# Patient Record
Sex: FEMALE | Race: WHITE
[De-identification: names, ages, dates, MRNs, and addresses within clinical notes are randomized per-mention and may not be internally consistent; named-entity substitution may affect disease eponyms.]

---

## 2018-03-13 ENCOUNTER — TELEPHONE (OUTPATIENT)
Dept: CASE MANAGEMENT | Age: 63
End: 2018-03-13

## 2018-03-13 NOTE — TELEPHONE ENCOUNTER
No call was made, encounter was opened for documentation in the lung nodule navigator flow sheet. Patient has suggested follow up per [place] for management of incidental pulmonary nodules. Patients with nodules measuring under 0.8cm are not automatically enrolled into incidental pulmonary nodule program.   Notification of nodules letter faxed to Danielle Kc DO 3/13/2018 10:44 AM along with instructions on how to enroll this patient into our incidental pulmonary nodule program if desired. Receipt verified. Letter mailed to patients home stating she should contact her primary care physician to discuss any follow up recommendations.    Christelle Hale, Lung Nodule Navigator

## 2018-03-20 ENCOUNTER — OFFICE VISIT (OUTPATIENT)
Dept: CARDIOLOGY CLINIC | Age: 63
End: 2018-03-20
Payer: COMMERCIAL

## 2018-03-20 VITALS
HEIGHT: 65 IN | RESPIRATION RATE: 20 BRPM | BODY MASS INDEX: 44.98 KG/M2 | DIASTOLIC BLOOD PRESSURE: 76 MMHG | HEART RATE: 86 BPM | SYSTOLIC BLOOD PRESSURE: 110 MMHG | WEIGHT: 270 LBS

## 2018-03-20 DIAGNOSIS — R06.09 DOE (DYSPNEA ON EXERTION): ICD-10-CM

## 2018-03-20 DIAGNOSIS — I50.20 SYSTOLIC CONGESTIVE HEART FAILURE, UNSPECIFIED CONGESTIVE HEART FAILURE CHRONICITY: Primary | ICD-10-CM

## 2018-03-20 PROCEDURE — 1036F TOBACCO NON-USER: CPT | Performed by: INTERNAL MEDICINE

## 2018-03-20 PROCEDURE — G8417 CALC BMI ABV UP PARAM F/U: HCPCS | Performed by: INTERNAL MEDICINE

## 2018-03-20 PROCEDURE — 99214 OFFICE O/P EST MOD 30 MIN: CPT | Performed by: INTERNAL MEDICINE

## 2018-03-20 PROCEDURE — 3014F SCREEN MAMMO DOC REV: CPT | Performed by: INTERNAL MEDICINE

## 2018-03-20 PROCEDURE — 3017F COLORECTAL CA SCREEN DOC REV: CPT | Performed by: INTERNAL MEDICINE

## 2018-03-20 PROCEDURE — G8428 CUR MEDS NOT DOCUMENT: HCPCS | Performed by: INTERNAL MEDICINE

## 2018-03-20 PROCEDURE — G8484 FLU IMMUNIZE NO ADMIN: HCPCS | Performed by: INTERNAL MEDICINE

## 2018-03-20 PROCEDURE — 93000 ELECTROCARDIOGRAM COMPLETE: CPT | Performed by: INTERNAL MEDICINE

## 2018-03-20 NOTE — PROGRESS NOTES
affect. Behavior is normal.     EKG:  normal sinus rhythm, nonspecific ST and T waves changes. ASSESSMENT AND PLAN:  Patient Active Problem List   Diagnosis    Valvular disease    Pulmonary hypertension    CHF (congestive heart failure) ([address]    Atrial fibrillation or flutter     1. Hx of MVR/OLIVER/CHF:    S/P MV replacement with # [address]. Cardiac cath in 2008: normal coronaries     BOOKER to assess. 2. Paroxysmal Afib/Aflutter: In sinus. S/P multiple ablations last was in 4/10 with Dr. Richard Wesley in [facility]  Seen locally by Dr. Juma Small. Perlita Hayes D.O.   Cardiologist  Cardiology, [facility]

## 2018-03-22 RX ORDER — ALBUTEROL SULFATE 90 UG/1
2 AEROSOL, METERED RESPIRATORY (INHALATION) EVERY 4 HOURS PRN
COMMUNITY

## 2018-03-22 RX ORDER — SERTRALINE HYDROCHLORIDE 100 MG/1
200 TABLET, FILM COATED ORAL DAILY
COMMUNITY

## 2018-03-22 RX ORDER — FUROSEMIDE 20 MG/1
20 TABLET ORAL 2 TIMES DAILY
COMMUNITY
End: 2020-08-27 | Stop reason: SDUPTHER

## 2018-03-28 ENCOUNTER — ANESTHESIA (OUTPATIENT)
Dept: NON INVASIVE DIAGNOSTICS | Age: 63
End: 2018-03-28

## 2018-03-28 ENCOUNTER — HOSPITAL ENCOUNTER (OUTPATIENT)
Dept: NON INVASIVE DIAGNOSTICS | Age: 63
Discharge: HOME | End: 2018-03-28
Payer: COMMERCIAL

## 2018-03-28 ENCOUNTER — ANESTHESIA EVENT (OUTPATIENT)
Dept: NON INVASIVE DIAGNOSTICS | Age: 63
End: 2018-03-28

## 2018-03-28 VITALS
BODY MASS INDEX: 42.59 KG/M2 | WEIGHT: 265 LBS | HEART RATE: 74 BPM | TEMPERATURE: 97.8 F | OXYGEN SATURATION: 98 % | HEIGHT: 66 IN | SYSTOLIC BLOOD PRESSURE: 113 MMHG | DIASTOLIC BLOOD PRESSURE: 57 MMHG | RESPIRATION RATE: 18 BRPM

## 2018-03-28 VITALS
SYSTOLIC BLOOD PRESSURE: 84 MMHG | RESPIRATION RATE: 22 BRPM | OXYGEN SATURATION: 96 % | DIASTOLIC BLOOD PRESSURE: 51 MMHG

## 2018-03-28 PROCEDURE — 2580000003 HC RX 258: Performed by: NURSE ANESTHETIST, CERTIFIED REGISTERED

## 2018-03-28 PROCEDURE — 3700000000 HC ANESTHESIA ATTENDED CARE

## 2018-03-28 PROCEDURE — 93325 DOPPLER ECHO COLOR FLOW MAPG: CPT

## 2018-03-28 PROCEDURE — 93320 DOPPLER ECHO COMPLETE: CPT

## 2018-03-28 PROCEDURE — 6360000002 HC RX W HCPCS: Performed by: NURSE ANESTHETIST, CERTIFIED REGISTERED

## 2018-03-28 PROCEDURE — 93312 ECHO TRANSESOPHAGEAL: CPT

## 2018-03-28 PROCEDURE — 3700000001 HC ADD 15 MINUTES (ANESTHESIA)

## 2018-03-28 PROCEDURE — 7100000001 HC PACU RECOVERY - ADDTL 15 MIN

## 2018-03-28 PROCEDURE — 7100000000 HC PACU RECOVERY - FIRST 15 MIN

## 2018-03-28 PROCEDURE — 6360000002 HC RX W HCPCS

## 2018-03-28 RX ORDER — SODIUM CHLORIDE 9 MG/ML
INJECTION, SOLUTION INTRAVENOUS CONTINUOUS PRN
Status: DISCONTINUED | OUTPATIENT
Start: 2018-03-28 | End: 2018-03-28 | Stop reason: SDUPTHER

## 2018-03-28 RX ORDER — PROPOFOL 10 MG/ML
INJECTION, EMULSION INTRAVENOUS PRN
Status: DISCONTINUED | OUTPATIENT
Start: 2018-03-28 | End: 2018-03-28 | Stop reason: SDUPTHER

## 2018-03-28 RX ADMIN — PROPOFOL 150 MG: 10 INJECTION, EMULSION INTRAVENOUS at 14:10

## 2018-03-28 RX ADMIN — SODIUM CHLORIDE: 9 INJECTION, SOLUTION INTRAVENOUS at 14:05

## 2018-03-28 RX ADMIN — PROPOFOL 75 MG: 10 INJECTION, EMULSION INTRAVENOUS at 14:15

## 2018-03-28 ASSESSMENT — PAIN DESCRIPTION - PROGRESSION
CLINICAL_PROGRESSION: NOT CHANGED

## 2018-03-28 ASSESSMENT — PAIN DESCRIPTION - PAIN TYPE
TYPE: ACUTE PAIN

## 2018-03-28 ASSESSMENT — PAIN DESCRIPTION - ORIENTATION
ORIENTATION: INNER

## 2018-03-28 ASSESSMENT — PAIN - FUNCTIONAL ASSESSMENT: PAIN_FUNCTIONAL_ASSESSMENT: 0-10

## 2018-03-28 ASSESSMENT — PAIN SCALES - GENERAL
PAINLEVEL_OUTOF10: 0
PAINLEVEL_OUTOF10: 0
PAINLEVEL_OUTOF10: 1
PAINLEVEL_OUTOF10: 1

## 2018-03-28 ASSESSMENT — PAIN DESCRIPTION - LOCATION
LOCATION: THROAT

## 2018-03-28 ASSESSMENT — PAIN DESCRIPTION - DESCRIPTORS: DESCRIPTORS: OTHER (COMMENT)

## 2018-03-28 ASSESSMENT — ENCOUNTER SYMPTOMS: SHORTNESS OF BREATH: 1

## 2018-03-28 NOTE — PROGRESS NOTES
Discharge instructions given to pt and her  at this time both verbalized understanding of instructions pamphlets given  [phone] pt discharged via wheelchair at this time

## 2018-03-28 NOTE — ANESTHESIA PRE PROCEDURE
2 puffs into the lungs every 6 hours as needed for Wheezing      sertraline (ZOLOFT) 100 MG tablet Take 200 mg by mouth daily Instructed to take am of procedure      furosemide (LASIX) 20 MG tablet Take 20 mg by mouth daily      nadolol (CORGARD) 40 MG tablet TAKE 1 TABLET DAILY 90 tablet 3    lisinopril (PRINIVIL;ZESTRIL) 10 MG tablet TAKE 1 TABLET DAILY 90 tablet 3    montelukast (SINGULAIR) 10 MG tablet Take 10 mg by mouth nightly   3    Budesonide-Formoterol Fumarate (SYMBICORT IN) Inhale 2 puffs into the lungs 2 times daily       KLOR-CON M20 20 MEQ tablet TAKE 1 TABLET DAILY 90 tablet 3    warfarin (COUMADIN) 6 MG tablet Take 6 mg by mouth daily. Taking 6 mg. X five days a week.  dofetilide (TIKOSYN) 500 MCG capsule Take 500 mcg by mouth 2 times daily.  Multiple Vitamin (MULTIVITAMIN PO) Take 1 tablet by mouth       OXYGEN Inhale into the lungs 3 l nasal cannula continous      Warfarin Sodium (COUMADIN PO) Take 8 mg by mouth. Taking 8 mg on Tues. And Thurs. No current facility-administered medications for this encounter.         Allergies:  No Known Allergies    Problem List:    Patient Active Problem List   Diagnosis Code    Valvular disease I38    Pulmonary hypertension I27.20    CHF (congestive heart failure) ([facility] I50.9    Atrial fibrillation or flutter [id number]       Past Medical History:        Diagnosis Date    [facility]rial fibrillation ([facility]     Atrial flutter ([facility]     CAD (coronary artery disease)     [facility]) [facility]     10/17/2011-echo revealed an estimated LVEF of 55-60%    COPD (chronic obstructive pulmonary disease) ([facility]     OLIVER (dyspnea on exertion)     Hypertension     Palpitations     Pulmonary hypertension     SOB (shortness of breath)     Valvular disease        Past Surgical History:        Procedure Laterality Date    ATRIAL ABLATION SURGERY      CARDIOVERSION      CARDIOVERSION  12/30/2011    Dr. Betsey Manrique converted to NSR with 50

## 2018-05-02 ENCOUNTER — OFFICE VISIT (OUTPATIENT)
Dept: CARDIOLOGY CLINIC | Age: 63
End: 2018-05-02
Payer: COMMERCIAL

## 2018-05-02 VITALS
DIASTOLIC BLOOD PRESSURE: 78 MMHG | RESPIRATION RATE: 20 BRPM | HEIGHT: 65 IN | WEIGHT: 275 LBS | SYSTOLIC BLOOD PRESSURE: 114 MMHG | BODY MASS INDEX: 45.82 KG/M2 | HEART RATE: 89 BPM

## 2018-05-02 DIAGNOSIS — I38 VALVULAR DISEASE: Primary | ICD-10-CM

## 2018-05-02 PROCEDURE — G8417 CALC BMI ABV UP PARAM F/U: HCPCS | Performed by: INTERNAL MEDICINE

## 2018-05-02 PROCEDURE — G8428 CUR MEDS NOT DOCUMENT: HCPCS | Performed by: INTERNAL MEDICINE

## 2018-05-02 PROCEDURE — 99214 OFFICE O/P EST MOD 30 MIN: CPT | Performed by: INTERNAL MEDICINE

## 2018-05-02 PROCEDURE — 3017F COLORECTAL CA SCREEN DOC REV: CPT | Performed by: INTERNAL MEDICINE

## 2018-05-02 PROCEDURE — 1036F TOBACCO NON-USER: CPT | Performed by: INTERNAL MEDICINE

## 2018-05-02 PROCEDURE — 93000 ELECTROCARDIOGRAM COMPLETE: CPT | Performed by: INTERNAL MEDICINE

## 2018-06-12 RX ORDER — NADOLOL 40 MG/1
TABLET ORAL
Qty: 90 TABLET | Refills: 3 | Status: SHIPPED | OUTPATIENT
Start: 2018-06-12 | End: 2019-04-08 | Stop reason: SDUPTHER

## 2018-06-12 RX ORDER — LISINOPRIL 10 MG/1
TABLET ORAL
Qty: 90 TABLET | Refills: 3 | Status: SHIPPED | OUTPATIENT
Start: 2018-06-12 | End: 2019-06-25 | Stop reason: SDUPTHER

## 2018-11-28 ENCOUNTER — OFFICE VISIT (OUTPATIENT)
Dept: CARDIOLOGY CLINIC | Age: 63
End: 2018-11-28
Payer: COMMERCIAL

## 2018-11-28 VITALS
SYSTOLIC BLOOD PRESSURE: 102 MMHG | WEIGHT: 265 LBS | OXYGEN SATURATION: 97 % | BODY MASS INDEX: 44.15 KG/M2 | HEART RATE: 78 BPM | HEIGHT: 65 IN | RESPIRATION RATE: 24 BRPM | DIASTOLIC BLOOD PRESSURE: 64 MMHG

## 2018-11-28 DIAGNOSIS — I48.0 PAROXYSMAL ATRIAL FIBRILLATION (HCC): Primary | ICD-10-CM

## 2018-11-28 DIAGNOSIS — I27.20 PULMONARY HYPERTENSION (HCC): ICD-10-CM

## 2018-11-28 PROCEDURE — 99214 OFFICE O/P EST MOD 30 MIN: CPT | Performed by: INTERNAL MEDICINE

## 2018-11-28 PROCEDURE — G8417 CALC BMI ABV UP PARAM F/U: HCPCS | Performed by: INTERNAL MEDICINE

## 2018-11-28 PROCEDURE — 93000 ELECTROCARDIOGRAM COMPLETE: CPT | Performed by: INTERNAL MEDICINE

## 2018-11-28 PROCEDURE — G8427 DOCREV CUR MEDS BY ELIG CLIN: HCPCS | Performed by: INTERNAL MEDICINE

## 2018-11-28 PROCEDURE — 1036F TOBACCO NON-USER: CPT | Performed by: INTERNAL MEDICINE

## 2018-11-28 PROCEDURE — 3017F COLORECTAL CA SCREEN DOC REV: CPT | Performed by: INTERNAL MEDICINE

## 2018-11-28 PROCEDURE — G8484 FLU IMMUNIZE NO ADMIN: HCPCS | Performed by: INTERNAL MEDICINE

## 2019-04-08 RX ORDER — NADOLOL 40 MG/1
TABLET ORAL
Qty: 90 TABLET | Refills: 3 | Status: SHIPPED
Start: 2019-04-08 | End: 2020-04-22

## 2019-06-25 RX ORDER — LISINOPRIL 10 MG/1
10 TABLET ORAL DAILY
Qty: 30 TABLET | Refills: 0 | Status: SHIPPED | OUTPATIENT
Start: 2019-06-25 | End: 2019-07-18 | Stop reason: SDUPTHER

## 2019-06-25 RX ORDER — LISINOPRIL 10 MG/1
TABLET ORAL
Qty: 90 TABLET | Refills: 3 | Status: SHIPPED | OUTPATIENT
Start: 2019-06-25 | End: 2019-07-18 | Stop reason: SDUPTHER

## 2019-07-18 RX ORDER — LISINOPRIL 10 MG/1
10 TABLET ORAL DAILY
Qty: 90 TABLET | Refills: 3 | Status: SHIPPED
Start: 2019-07-18 | End: 2020-07-27 | Stop reason: SDUPTHER

## 2020-04-23 RX ORDER — NADOLOL 40 MG/1
TABLET ORAL
Qty: 90 TABLET | Refills: 0 | Status: SHIPPED
Start: 2020-04-23 | End: 2020-07-27 | Stop reason: SDUPTHER

## 2020-07-28 RX ORDER — LISINOPRIL 10 MG/1
10 TABLET ORAL DAILY
Qty: 30 TABLET | Refills: 0 | Status: SHIPPED
Start: 2020-07-28 | End: 2020-08-27 | Stop reason: SDUPTHER

## 2020-07-28 RX ORDER — NADOLOL 40 MG/1
TABLET ORAL
Qty: 30 TABLET | Refills: 0 | Status: SHIPPED
Start: 2020-07-28 | End: 2020-08-27 | Stop reason: SDUPTHER

## 2020-08-27 ENCOUNTER — OFFICE VISIT (OUTPATIENT)
Dept: CARDIOLOGY CLINIC | Age: 65
End: 2020-08-27
Payer: MEDICARE

## 2020-08-27 VITALS
DIASTOLIC BLOOD PRESSURE: 72 MMHG | SYSTOLIC BLOOD PRESSURE: 98 MMHG | HEART RATE: 72 BPM | BODY MASS INDEX: 42.15 KG/M2 | HEIGHT: 65 IN | WEIGHT: 253 LBS

## 2020-08-27 PROCEDURE — 3017F COLORECTAL CA SCREEN DOC REV: CPT | Performed by: INTERNAL MEDICINE

## 2020-08-27 PROCEDURE — 1090F PRES/ABSN URINE INCON ASSESS: CPT | Performed by: INTERNAL MEDICINE

## 2020-08-27 PROCEDURE — G8417 CALC BMI ABV UP PARAM F/U: HCPCS | Performed by: INTERNAL MEDICINE

## 2020-08-27 PROCEDURE — 1036F TOBACCO NON-USER: CPT | Performed by: INTERNAL MEDICINE

## 2020-08-27 PROCEDURE — 93000 ELECTROCARDIOGRAM COMPLETE: CPT | Performed by: INTERNAL MEDICINE

## 2020-08-27 PROCEDURE — 99214 OFFICE O/P EST MOD 30 MIN: CPT | Performed by: INTERNAL MEDICINE

## 2020-08-27 PROCEDURE — 4040F PNEUMOC VAC/ADMIN/RCVD: CPT | Performed by: INTERNAL MEDICINE

## 2020-08-27 PROCEDURE — G8427 DOCREV CUR MEDS BY ELIG CLIN: HCPCS | Performed by: INTERNAL MEDICINE

## 2020-08-27 PROCEDURE — 1123F ACP DISCUSS/DSCN MKR DOCD: CPT | Performed by: INTERNAL MEDICINE

## 2020-08-27 PROCEDURE — G8400 PT W/DXA NO RESULTS DOC: HCPCS | Performed by: INTERNAL MEDICINE

## 2020-08-27 RX ORDER — LISINOPRIL 10 MG/1
10 TABLET ORAL DAILY
Qty: 90 TABLET | Refills: 3 | Status: SHIPPED
Start: 2020-08-27 | End: 2021-01-13 | Stop reason: SDUPTHER

## 2020-08-27 RX ORDER — NADOLOL 40 MG/1
TABLET ORAL
Qty: 90 TABLET | Refills: 3 | Status: SHIPPED
Start: 2020-08-27 | End: 2021-01-13 | Stop reason: SDUPTHER

## 2020-08-27 RX ORDER — POTASSIUM CHLORIDE 20 MEQ/1
TABLET, EXTENDED RELEASE ORAL
Qty: 90 TABLET | Refills: 3 | Status: SHIPPED | OUTPATIENT
Start: 2020-08-27

## 2020-08-27 RX ORDER — FUROSEMIDE 20 MG/1
20 TABLET ORAL 2 TIMES DAILY
Qty: 180 TABLET | Refills: 3 | Status: SHIPPED | OUTPATIENT
Start: 2020-08-27

## 2020-08-27 RX ORDER — ASPIRIN 81 MG/1
81 TABLET ORAL DAILY
COMMUNITY

## 2020-11-19 ENCOUNTER — HOSPITAL ENCOUNTER (OUTPATIENT)
Dept: HOSPITAL 83 - LAB | Age: 65
Discharge: HOME | End: 2020-11-19
Attending: FAMILY MEDICINE
Payer: MEDICARE

## 2020-11-19 DIAGNOSIS — Z79.899: ICD-10-CM

## 2020-11-19 DIAGNOSIS — R53.83: ICD-10-CM

## 2020-11-19 DIAGNOSIS — E55.9: Primary | ICD-10-CM

## 2020-11-19 DIAGNOSIS — R79.89: ICD-10-CM

## 2020-11-19 LAB
25(OH)D3 SERPL-MCNC: 27 NG/ML (ref 30–100)
ABSOLUTE BASO #: 0 10*3/UL (ref 0–0.1)
ABSOLUTE EOS #: 0.1 10*3/UL (ref 0–0.4)
ABSOLUTE NEUT #: 4.9 10*3/UL (ref 2.3–7.9)
ALBUMIN SERPL-MCNC: 3.6 GM/DL (ref 3.1–4.5)
ALBUMIN: 3.6 GM/DL (ref 3.1–4.5)
ALP BLD-CCNC: 64 U/L (ref 45–117)
ALP SERPL-CCNC: 64 U/L (ref 45–117)
ALT SERPL W P-5'-P-CCNC: 24 U/L (ref 12–78)
ALT SERPL-CCNC: 24 U/L (ref 12–78)
AST SERPL-CCNC: 16 IU/L (ref 3–35)
AST SERPL-CCNC: 16 IU/L (ref 3–35)
BACTERIA #/AREA URNS HPF: (no result) /[HPF]
BACTERIA, URINE: ABNORMAL
BASOPHILS # BLD AUTO: 0 10*3/UL (ref 0–0.1)
BASOPHILS %: 0.6 % (ref 0–1)
BASOPHILS NFR BLD AUTO: 0.6 % (ref 0–1)
BILIRUB SERPL-MCNC: 0.4 MG/DL (ref 0.2–1)
BILIRUBIN: NEGATIVE
BLOOD: NEGATIVE
BUN BLDV-MCNC: 19 MG/DL (ref 7–24)
BUN SERPL-MCNC: 19 MG/DL (ref 7–24)
CALCIUM SERPL-MCNC: 9.1 MG/DL (ref 8.5–10.5)
CHLORIDE BLD-SCNC: 107 MMOL/L (ref 98–107)
CHLORIDE SERPL-SCNC: 107 MMOL/L (ref 98–107)
CHOLEST SERPL-MCNC: 199 MG/DL (ref ?–200)
CHOLESTEROL: 199 MG/DL
CK SERPL-CCNC: 38 U/L (ref 26–192)
CLARITY: CLEAR
CO2: 30 MMOL/L (ref 21–32)
COLOR: YELLOW
CPK: 38 U/L (ref 26–192)
CREAT SERPL-MCNC: 0.89 MG/DL (ref 0.55–1.02)
CREAT SERPL-MCNC: 0.89 MG/DL (ref 0.55–1.02)
EOSINOPHIL # BLD AUTO: 0.1 10*3/UL (ref 0–0.4)
EOSINOPHIL # BLD AUTO: 1.7 % (ref 1–4)
EOSINOPHILS %: 1.7 % (ref 1–4)
EPI CELLS #/AREA URNS HPF: (no result) /[HPF]
EPITHELIAL CELLS, UA: ABNORMAL
ERYTHROCYTE [DISTWIDTH] IN BLOOD BY AUTOMATED COUNT: 13.6 % (ref 0–14.5)
ESTIMATED AVERAGE GLUCOSE: 120
GFR AFRICAN AMERICAN: > 60 ML/MIN
GFR SERPL CREATININE-BSD FRML MDRD: >60 ML/MIN/
GGT SERPL-CCNC: 16 U/L (ref 5–55)
GGT: 16 U/L (ref 5–55)
GLUCOSE: 88 MG/DL (ref 65–99)
GLUCOSE: NEGATIVE
HBA1C MFR BLD: 5.8 % (ref 4.8–5.6)
HCT VFR BLD AUTO: 37.2 % (ref 37–47)
HCT VFR BLD CALC: 37.2 % (ref 37–47)
HDLC SERPL-MCNC: 67 MG/DL (ref 40–60)
HDLC SERPL-MCNC: 67 MG/DL (ref 40–60)
HEMOGLOBIN: 11.9 G/DL (ref 12–16)
IMMATURE GRANULOCYTES #: 0 10*3/UL (ref 0–0.1)
IMMATURE GRANULOCYTES: 0.3 % (ref 0–1)
IMMATURE RETIC FRACT: 8.9 % (ref 2.4–13.3)
IRON SATURATION: 26 %
IRON SERPL-MCNC: 82 UG/DL (ref 50–170)
IRON: 82 UG/DL (ref 50–170)
KETONES: NEGATIVE
LDL CHOLESTEROL: 114 MG/DL (ref 9–159)
LDLC SERPL DIRECT ASSAY-MCNC: 114 MG/DL (ref 9–159)
LEUKOCYTE ESTERASE, URINE: ABNORMAL
LYMPHOCYTE %: 13.6 % (ref 27–41)
LYMPHOCYTES # BLD AUTO: 0.9 10*3/UL (ref 1.3–4.4)
LYMPHOCYTES # BLD: 0.9 10*3/UL (ref 1.3–4.4)
LYMPHOCYTES NFR BLD AUTO: 13.6 % (ref 27–41)
MCH RBC QN AUTO: 31.6 PG (ref 27–31)
MCH RBC QN AUTO: 31.6 PG (ref 27–31)
MCHC RBC AUTO-ENTMCNC: 32 G/DL (ref 33–37)
MCHC RBC AUTO-ENTMCNC: 32 G/DL (ref 33–37)
MCV RBC AUTO: 98.9 FL (ref 81–99)
MCV RBC AUTO: 98.9 FL (ref 81–99)
MONOCYTES # BLD AUTO: 0.5 10*3/UL (ref 0.1–1)
MONOCYTES # BLD: 0.5 10*3/UL (ref 0.1–1)
MONOCYTES %: 8.4 % (ref 3–9)
MONOCYTES NFR BLD MANUAL: 8.4 % (ref 3–9)
NEUT #: 4.9 10*3/UL (ref 2.3–7.9)
NEUT %: 75.4 % (ref 47–73)
NEUTROPHILS %: 75.4 % (ref 47–73)
NITRITE, URINE: NEGATIVE
NRBC BLD QL AUTO: 0 % (ref 0–0)
NUCLEATED RED BLOOD CELLS: 0 % (ref 0–0)
PDW BLD-RTO: 13.6 % (ref 0–14.5)
PH UR STRIP: 5.5 [PH] (ref 4.5–8)
PH, URINE: 5.5 (ref 4.5–8)
PLATELET # BLD AUTO: 159 10*3/UL (ref 130–400)
PLATELET # BLD: 159 10*3/UL (ref 130–400)
PMV BLD AUTO: 11.4 FL (ref 9.6–12.3)
PMV BLD AUTO: 11.4 FL (ref 9.6–12.3)
POTASSIUM SERPL-SCNC: 4.3 MMOL/L (ref 3.5–5.1)
POTASSIUM SERPL-SCNC: 4.3 MMOL/L (ref 3.5–5.1)
PROT SERPL-MCNC: 7.1 GM/DL (ref 6.4–8.2)
PROTEIN UA: ABNORMAL
RBC # BLD AUTO: 3.76 10*6/UL (ref 4.1–5.1)
RBC # BLD: 3.76 10*6/UL (ref 4.1–5.1)
RBC #/AREA URNS HPF: (no result) RBC/HPF (ref 0–2)
RBC UA: ABNORMAL RBC/HPF (ref 0–2)
RET-HE: 34.3 PG (ref 32.1–37.9)
RETICS/RBC NFR AUTO: 1.39 % (ref 0.5–2.5)
RETICULOCYTE ABSOLUTE COUNT: 0.05 10*6/UL (ref 0.03–0.1)
RETICULOCYTE COUNT PCT: 1.39 % (ref 0.5–2.5)
SODIUM BLD-SCNC: 141 MMOL/L (ref 136–145)
SODIUM SERPL-SCNC: 141 MMOL/L (ref 136–145)
SP GR UR: 1.02 (ref 1–1.03)
SPECIFIC GRAVITY UA: 1.02 (ref 1–1.03)
T3 SERPL-MCNC: 34 % (ref 31–39)
T3 UPTAKE: 34 % (ref 31–39)
T4 SERPL-MCNC: 5.9 UG/DL (ref 4.8–13.9)
T4 TOTAL: 5.9 UG/DL (ref 4.8–13.9)
TIBC SERPL-MCNC: 314 UG/DL (ref 250–450)
TOTAL IRON BINDING CAPACITY: 314 UG/DL (ref 250–450)
TOTAL PROTEIN: 7.1 GM/DL (ref 6.4–8.2)
TRIGL SERPL-MCNC: 88 MG/DL
TRIGL SERPL-MCNC: 88 MG/DL (ref ?–150)
TSH SERPL DL<=0.005 MIU/L-ACNC: 1.32 UIU/ML (ref 0.36–4.75)
TSH SERPL DL<=0.05 MIU/L-ACNC: 1.32 UIU/ML (ref 0.36–4.75)
UNSATURATED IRON BINDING CAPACITY: 232 UG/DL (ref 110–365)
UROBILINOGEN UR STRIP-MCNC: 1 E.U./DL (ref 0–1)
UROBILINOGEN, URINE: 1 E.U./DL (ref 0–1)
VITAMIN B12: 460 PG/ML (ref 247–911)
VLDLC SERPL CALC-MCNC: 18 MG/DL (ref 6–40)
VLDLC SERPL CALC-MCNC: 18 MG/DL (ref 6–40)
WBC # BLD: 6.5 10*3/UL (ref 4.8–10.8)
WBC #/AREA URNS HPF: (no result) WBC/HPF (ref 0–5)
WBC NRBC COR # BLD AUTO: 6.5 10*3/UL (ref 4.8–10.8)
WBC URINE: ABNORMAL WBC/HPF (ref 0–5)

## 2020-11-21 LAB
FERRITIN SERPL-MCNC: 154.4 NG/ML (ref 10–291)
FERRITIN: 154.4 NG/ML (ref 10–291)
FOLIC ACID, SERUM: 15.97 NG/ML
VITAMIN B-12: 460 PG/ML (ref 247–911)
VITAMIN D 25-HYDROXY: 27 NG/ML (ref 30–100)

## 2021-01-13 RX ORDER — LISINOPRIL 10 MG/1
10 TABLET ORAL DAILY
Qty: 90 TABLET | Refills: 3 | Status: SHIPPED
Start: 2021-01-13 | End: 2021-10-25

## 2021-01-13 RX ORDER — NADOLOL 40 MG/1
TABLET ORAL
Qty: 90 TABLET | Refills: 3 | Status: SHIPPED
Start: 2021-01-13 | End: 2021-11-05

## 2021-06-17 ENCOUNTER — HOSPITAL ENCOUNTER (OUTPATIENT)
Dept: HOSPITAL 83 - LAB | Age: 66
Discharge: HOME | End: 2021-06-17
Attending: FAMILY MEDICINE
Payer: MEDICARE

## 2021-06-17 DIAGNOSIS — R06.02: ICD-10-CM

## 2021-06-17 DIAGNOSIS — E78.5: Primary | ICD-10-CM

## 2021-06-17 DIAGNOSIS — Z79.899: ICD-10-CM

## 2021-06-17 DIAGNOSIS — R53.83: ICD-10-CM

## 2021-06-17 DIAGNOSIS — E55.9: ICD-10-CM

## 2021-06-17 DIAGNOSIS — R79.89: ICD-10-CM

## 2021-06-17 LAB
25(OH)D3 SERPL-MCNC: 34.5 NG/ML (ref 30–100)
ALBUMIN SERPL-MCNC: 3.6 GM/DL (ref 3.1–4.5)
ALP SERPL-CCNC: 65 U/L (ref 45–117)
ALT SERPL W P-5'-P-CCNC: 26 U/L (ref 12–78)
AST SERPL-CCNC: 16 IU/L (ref 3–35)
BASOPHILS # BLD AUTO: 0.1 10*3/UL (ref 0–0.1)
BASOPHILS NFR BLD AUTO: 0.7 % (ref 0–1)
BUN SERPL-MCNC: 34 MG/DL (ref 7–24)
CHLORIDE SERPL-SCNC: 107 MMOL/L (ref 98–107)
CHOLEST SERPL-MCNC: 234 MG/DL (ref ?–200)
CREAT SERPL-MCNC: 1.24 MG/DL (ref 0.55–1.02)
EOSINOPHIL # BLD AUTO: 0.1 10*3/UL (ref 0–0.4)
EOSINOPHIL # BLD AUTO: 1.6 % (ref 1–4)
ERYTHROCYTE [DISTWIDTH] IN BLOOD BY AUTOMATED COUNT: 13.2 % (ref 0–14.5)
FERRITIN SERPL-MCNC: 134.4 NG/ML (ref 10–291)
GGT SERPL-CCNC: 23 U/L (ref 5–55)
HCT VFR BLD AUTO: 35.6 % (ref 37–47)
IRON SERPL-MCNC: 72 UG/DL (ref 50–170)
LDLC SERPL DIRECT ASSAY-MCNC: 147 MG/DL (ref 9–159)
LYMPHOCYTES # BLD AUTO: 1.1 10*3/UL (ref 1.3–4.4)
LYMPHOCYTES NFR BLD AUTO: 13.9 % (ref 27–41)
MCH RBC QN AUTO: 32 PG (ref 27–31)
MCHC RBC AUTO-ENTMCNC: 31.7 G/DL (ref 33–37)
MCV RBC AUTO: 100.8 FL (ref 81–99)
MONOCYTES # BLD AUTO: 0.7 10*3/UL (ref 0.1–1)
MONOCYTES NFR BLD MANUAL: 8.8 % (ref 3–9)
NEUT #: 5.7 10*3/UL (ref 2.3–7.9)
NEUT %: 74.7 % (ref 47–73)
NRBC BLD QL AUTO: 0 % (ref 0–0)
PLATELET # BLD AUTO: 180 10*3/UL (ref 130–400)
PMV BLD AUTO: 10.9 FL (ref 9.6–12.3)
POTASSIUM SERPL-SCNC: 4.9 MMOL/L (ref 3.5–5.1)
PROT SERPL-MCNC: 7.8 GM/DL (ref 6.4–8.2)
RBC # BLD AUTO: 3.53 10*6/UL (ref 4.1–5.1)
RETICS/RBC NFR AUTO: 1.76 % (ref 0.5–2.5)
SODIUM SERPL-SCNC: 141 MMOL/L (ref 136–145)
TIBC SERPL-MCNC: 335 UG/DL (ref 250–450)
TRIGL SERPL-MCNC: 82 MG/DL (ref ?–150)
TSH SERPL DL<=0.005 MIU/L-ACNC: 1.35 UIU/ML (ref 0.36–4.75)
VITAMIN B12: 816 PG/ML (ref 247–911)
WBC NRBC COR # BLD AUTO: 7.6 10*3/UL (ref 4.8–10.8)

## 2021-07-02 ENCOUNTER — HOSPITAL ENCOUNTER (OUTPATIENT)
Dept: HOSPITAL 83 - LAB | Age: 66
Discharge: HOME | End: 2021-07-02
Attending: FAMILY MEDICINE
Payer: MEDICARE

## 2021-07-02 DIAGNOSIS — E78.5: Primary | ICD-10-CM

## 2021-07-02 DIAGNOSIS — R79.89: ICD-10-CM

## 2021-07-02 DIAGNOSIS — R53.83: ICD-10-CM

## 2021-07-02 DIAGNOSIS — E55.9: ICD-10-CM

## 2021-07-02 DIAGNOSIS — R74.8: ICD-10-CM

## 2021-07-02 DIAGNOSIS — Z79.899: ICD-10-CM

## 2021-07-02 LAB
BACTERIA #/AREA URNS HPF: (no result) /[HPF]
EPI CELLS #/AREA URNS HPF: (no result) /[HPF]
KETONES UR QL STRIP: (no result)
LEUKOCYTE ESTERASE UR QL STRIP: (no result)
PH UR STRIP: 5.5 [PH] (ref 4.5–8)
SP GR UR: 1.02 (ref 1–1.03)
UROBILINOGEN UR STRIP-MCNC: 0.2 E.U./DL (ref 0–1)
WBC #/AREA URNS HPF: (no result) WBC/HPF (ref 0–5)

## 2021-08-12 ENCOUNTER — TELEPHONE (OUTPATIENT)
Dept: CARDIOLOGY CLINIC | Age: 66
End: 2021-08-12

## 2021-08-12 NOTE — TELEPHONE ENCOUNTER
Patient is due for her annual visit but she would like an echo prior to visit, last echo 2018, can we order?

## 2021-08-13 DIAGNOSIS — I34.0 MITRAL VALVE INSUFFICIENCY, UNSPECIFIED ETIOLOGY: Primary | ICD-10-CM

## 2021-09-03 ENCOUNTER — HOSPITAL ENCOUNTER (OUTPATIENT)
Dept: CARDIOLOGY | Age: 66
Discharge: HOME | End: 2021-09-03
Payer: MEDICARE

## 2021-09-03 DIAGNOSIS — I34.0 MITRAL VALVE INSUFFICIENCY, UNSPECIFIED ETIOLOGY: ICD-10-CM

## 2021-09-03 LAB
LV EF: 55 %
LVEF MODALITY: NORMAL

## 2021-09-03 PROCEDURE — 93306 TTE W/DOPPLER COMPLETE: CPT

## 2021-10-05 ENCOUNTER — OFFICE VISIT (OUTPATIENT)
Dept: CARDIOLOGY CLINIC | Age: 66
End: 2021-10-05
Payer: MEDICARE

## 2021-10-05 VITALS
RESPIRATION RATE: 20 BRPM | BODY MASS INDEX: 40.98 KG/M2 | SYSTOLIC BLOOD PRESSURE: 102 MMHG | WEIGHT: 246 LBS | DIASTOLIC BLOOD PRESSURE: 68 MMHG | HEIGHT: 65 IN | HEART RATE: 69 BPM

## 2021-10-05 DIAGNOSIS — I27.20 PULMONARY HYPERTENSION (HCC): Primary | ICD-10-CM

## 2021-10-05 PROCEDURE — 1090F PRES/ABSN URINE INCON ASSESS: CPT | Performed by: INTERNAL MEDICINE

## 2021-10-05 PROCEDURE — G8427 DOCREV CUR MEDS BY ELIG CLIN: HCPCS | Performed by: INTERNAL MEDICINE

## 2021-10-05 PROCEDURE — 93000 ELECTROCARDIOGRAM COMPLETE: CPT | Performed by: INTERNAL MEDICINE

## 2021-10-05 PROCEDURE — G8400 PT W/DXA NO RESULTS DOC: HCPCS | Performed by: INTERNAL MEDICINE

## 2021-10-05 PROCEDURE — G8417 CALC BMI ABV UP PARAM F/U: HCPCS | Performed by: INTERNAL MEDICINE

## 2021-10-05 PROCEDURE — 1036F TOBACCO NON-USER: CPT | Performed by: INTERNAL MEDICINE

## 2021-10-05 PROCEDURE — 3017F COLORECTAL CA SCREEN DOC REV: CPT | Performed by: INTERNAL MEDICINE

## 2021-10-05 PROCEDURE — G8484 FLU IMMUNIZE NO ADMIN: HCPCS | Performed by: INTERNAL MEDICINE

## 2021-10-05 PROCEDURE — 99214 OFFICE O/P EST MOD 30 MIN: CPT | Performed by: INTERNAL MEDICINE

## 2021-10-05 PROCEDURE — 4040F PNEUMOC VAC/ADMIN/RCVD: CPT | Performed by: INTERNAL MEDICINE

## 2021-10-05 PROCEDURE — 1123F ACP DISCUSS/DSCN MKR DOCD: CPT | Performed by: INTERNAL MEDICINE

## 2021-10-05 RX ORDER — LORAZEPAM 0.5 MG/1
TABLET ORAL
COMMUNITY
Start: 2021-09-23

## 2021-10-05 NOTE — PROGRESS NOTES
CHIEF COMPLAINT: MVR/Afib    HISTORY OF PRESENT ILLNESS: Patient is a 78 y.o. female seen at the request of Jhonny Armendariz DO. Patient with history of MVR with OLIVER and LE edema issues. No CP or angina.      Past Medical History:   Diagnosis Date    Atrial fibrillation (HCC)     Atrial flutter (HCC)     CAD (coronary artery disease)     CHF (congestive heart failure) ([facility]     10/17/2011-echo revealed an estimated LVEF of 55-60%    COPD (chronic obstructive pulmonary disease) (HCC)     OLIVER (dyspnea on exertion)     Hypertension     Palpitations     Pulmonary hypertension (HCC)     SOB (shortness of breath)     Valvular disease        Patient Active Problem List   Diagnosis    Valvular disease    Pulmonary hypertension ([facility]    [facility]congest[facility] failure) ([facility]    Atrial fibrillation or flutter       No Known Allergies    Current Outpatient Medications   Medication Sig Dispense Refill    LORazepam (ATIVAN) 0.5 MG tablet ONE TABLET EVERY 8 TO 12 HOURS WHEN NEEDED ATIVAN      lisinopril (PRINIVIL;ZESTRIL) 10 MG tablet Take 1 tablet by mouth daily 90 tablet 3    nadolol (CORGARD) 40 MG tablet Take 1tablet daily 90 tablet 3    aspirin 81 MG EC tablet Take 81 mg by mouth daily      furosemide (LASIX) 20 MG tablet Take 1 tablet by mouth 2 times daily 180 tablet 3    potassium chloride (KLOR-CON M20) 20 MEQ extended release tablet TAKE 1 TABLET DAILY 90 tablet 3    albuterol sulfate HFA (PROVENTIL HFA) 108 (90 Base) MCG/ACT inhaler Inhale 2 puffs into the lungs every 4 hours as needed for Wheezing       sertraline (ZOLOFT) 100 MG tablet Take 200 mg by mouth daily Instructed to take am of procedure      montelukast (SINGULAIR) 10 MG tablet Take 10 mg by mouth nightly   3    OXYGEN Inhale into the lungs 3 l nasal cannula continous      Budesonide-Formoterol Fumarate (SYMBICORT IN) Inhale 2 puffs into the lungs 2 times daily       warfarin (COUMADIN) 6 MG tablet Take 5.5 mg by mouth daily Taking 5.5 mg every other day, 6 mg alternate every other day      dofetilide (TIKOSYN) 500 MCG capsule Take 500 mcg by mouth 2 times daily.  Multiple Vitamin (MULTIVITAMIN PO) Take 1 tablet by mouth        No current facility-administered medications for this visit. Social History     Socioeconomic History    Marital status:      Spouse name: Not on file    Number of children: Not on file    Years of education: Not on file    Highest education level: Not on file   Occupational History    Not on file   Tobacco Use    Smoking status: Former Smoker     Packs/day: 2.00     Years: 10.00     Pack years: 20.00     Types: Cigarettes     Quit date: 1988     Years since quittin.7    Smokeless tobacco: Never Used   Vaping Use    Vaping Use: Never used   Substance and Sexual Activity    Alcohol use: No    Drug use: No    Sexual activity: Not on file   Other Topics Concern    Not on file   Social History Narrative    Not on file     Social Determinants of Health     Financial Resource Strain:     Difficulty of Paying Living Expenses:    Food Insecurity:     Worried About [address] in the Last Year:     [address] [employer] in the Last Year:    Transportation Needs:     Lack of Transportation (Medical):      Lack of Transportation (Non-Medical):    Physical Activity:     Days of Exercise per Week:     Minutes of Exercise per Session:    Stress:     Feeling of Stress :    Social Connections:     Frequency of Communication with Friends and Family:     Frequency of Social Gatherings with Friends and Family:     Attends [employer]ars[employer] Services:     Active Member of Clubs or Organizations:     Attends Club or Organization Meetings:     Marital Status:    Intimate Partner Violence:     Fear of Current or Ex-Partner:     Emotionally Abused:     Physically Abused:     Sexually Abused:        Family History   Problem Relation Age of Onset    Diabetes Mother      Review of Systems:  Heart: as above   Lungs: as above   Eyes: denies changes in vision or discharge. Ears: denies changes in hearing or pain. Nose: denies epistaxis or masses   Throat: denies sore throat or trouble swallowing. Neuro: denies numbness, tingling, tremors. Skin: denies rashes or itching. : denies hematuria, dysuria   GI: denies vomiting, diarrhea   Psych: denies mood changed, anxiety, depression. All other systems negative. Physical Exam   /68   Pulse 69   Resp 20   Ht 5' 5\" (1.651 m)   Wt 246 lb (111.6 kg)   BMI 40.94 kg/m²   Constitutional: Oriented to person, place, and time. Well-developed and well-nourished. No distress. Head: Normocephalic and atraumatic. Eyes: EOM are normal. Pupils are equal, round, and reactive to light. Neck: Normal range of motion. Neck supple. No hepatojugular reflux and no JVD present. Carotid bruit is not present. No tracheal deviation present. No thyromegaly present. Cardiovascular: Normal rate, regular rhythm, ESTER 2/6  Pulmonary/Chest: Effort normal and breath sounds normal. No respiratory distress. No wheezes. No rales. No tenderness. Abdominal: Soft. Bowel sounds are normal. No distension and no mass. No tenderness. No rebound and no guarding. Musculoskeletal: Normal range of motion. No edema and no tenderness. Lymphadenopathy:   No cervical adenopathy. No groin adenopathy. Neurological: Alert and oriented to person, place, and time. Skin: Skin is warm and dry. No rash noted. Not diaphoretic. No erythema. Psychiatric: Normal mood and affect. Behavior is normal.     EKG:  normal sinus rhythm, nonspecific ST and T waves changes. Echo Summary 3/28/18:   Overall ejection fraction is normal .   Normal right ventricle structure and function.   Normally functioning bileaflet mechanical valve in mitral position.   Mean transmitral gradient 5 mmHg.   Physiologic mitral regurgitation is present.     Echo Summary 9/3/2021:   Ejection fraction is visually estimated at 55%. No regional wall motion abnormalities seen. Normal right ventricle structure and function. Left atrial volume index of 30 ml per meters squared BSA. The aortic valve is trileaflet. Mild aortic stenosis is present. The aortic valve area is 1.7 cm2 with a maximum gradient of 22 mmHg and a mean gradient of 12 mmHg. No evidence of aortic valve regurgitation. Mechanical prosthetic valve in the mitral position. Mean transmitral gradient 11 mmHg. (prior mean was 7 mmHg)   Physiologic and/or trace mitral regurgitation is present. Moderate tricuspid regurgitation. Pulmonary hypertension is mild. RVSP is 44 mmHg. ASSESSMENT AND PLAN:  Patient Active Problem List   Diagnosis    Valvular disease    Pulmonary hypertension ([address]    CHF (congestive heart failure) ([address]    Atrial fibrillation or flutter     1. Hx of MVR/OLIVER/CHF:    S/P MV replacement with #[address]. Cardiac cath in 2008: normal coronaries     TTE echo 0/3/2021.     2. Paroxysmal Afib/Aflutter: In sinus. Rhythm controlled on tikosyn. On warfarin. S/P multiple ablations last was in 4/10 with Dr. Agnieszka Corcoran in [facility]    Seen locally by Dr. Pedro Coffman. 3. COPD: On oxygen. Anand Mireles D.O.   Cardiologist  Cardiology, [facility]

## 2021-10-20 ENCOUNTER — TELEPHONE (OUTPATIENT)
Dept: CARDIOLOGY CLINIC | Age: 66
End: 2021-10-20

## 2021-10-20 NOTE — TELEPHONE ENCOUNTER
Patient needs cardiac clearance for an EGD&Colonoscopy with , she would need instructions regarding warfarin and if she needs bridged, last OV 2weeks ago, please advise

## 2021-10-20 NOTE — TELEPHONE ENCOUNTER
Okay to proceed. Patient is to hold warfarin 5 days prior. She is to start subcutaneous lovenox (script sent) 3 days prior and take it twice a day. Hold am of procedure and restart lovenox and warfarin as soon as safe from procedure standpoint. Will need daily INR after resuming warfarin until we get her back to therapeutic. Alesha Fitzgerald D.O.   Cardiologist  Cardiology, [facility]

## 2021-10-25 RX ORDER — LISINOPRIL 10 MG/1
10 TABLET ORAL DAILY
Qty: 90 TABLET | Refills: 3 | Status: SHIPPED | OUTPATIENT
Start: 2021-10-25

## 2021-11-03 ENCOUNTER — HOSPITAL ENCOUNTER (OUTPATIENT)
Dept: HOSPITAL 83 - LAB | Age: 66
Discharge: HOME | End: 2021-11-03
Attending: INTERNAL MEDICINE
Payer: MEDICARE

## 2021-11-03 DIAGNOSIS — Z79.899: ICD-10-CM

## 2021-11-03 DIAGNOSIS — N18.31: Primary | ICD-10-CM

## 2021-11-03 LAB
ALBUMIN SERPL-MCNC: 3.4 GM/DL (ref 3.1–4.5)
BACTERIA #/AREA URNS HPF: (no result) /[HPF]
BASOPHILS # BLD AUTO: 0.1 10*3/UL (ref 0–0.1)
BASOPHILS NFR BLD AUTO: 0.7 % (ref 0–1)
BUN SERPL-MCNC: 28 MG/DL (ref 7–24)
CHLORIDE SERPL-SCNC: 106 MMOL/L (ref 98–107)
CREAT SERPL-MCNC: 1.19 MG/DL (ref 0.55–1.02)
CREAT UR-MCNC: 163 MG/DL
EOSINOPHIL # BLD AUTO: 0.2 10*3/UL (ref 0–0.4)
EOSINOPHIL # BLD AUTO: 2.4 % (ref 1–4)
ERYTHROCYTE [DISTWIDTH] IN BLOOD BY AUTOMATED COUNT: 13.2 % (ref 0–14.5)
HCT VFR BLD AUTO: 34.3 % (ref 37–47)
LEUKOCYTE ESTERASE UR QL STRIP: (no result)
LYMPHOCYTES # BLD AUTO: 1.1 10*3/UL (ref 1.3–4.4)
LYMPHOCYTES NFR BLD AUTO: 15.6 % (ref 27–41)
MCH RBC QN AUTO: 31.4 PG (ref 27–31)
MCHC RBC AUTO-ENTMCNC: 30.9 G/DL (ref 33–37)
MCV RBC AUTO: 101.5 FL (ref 81–99)
MONOCYTES # BLD AUTO: 0.6 10*3/UL (ref 0.1–1)
MONOCYTES NFR BLD MANUAL: 9.3 % (ref 3–9)
NEUT #: 4.9 10*3/UL (ref 2.3–7.9)
NEUT %: 71.7 % (ref 47–73)
NRBC BLD QL AUTO: 0 10*3/UL (ref 0–0)
PH UR STRIP: 5.5 [PH] (ref 4.5–8)
PLATELET # BLD AUTO: 159 10*3/UL (ref 130–400)
PMV BLD AUTO: 10.7 FL (ref 9.6–12.3)
POTASSIUM SERPL-SCNC: 4.8 MMOL/L (ref 3.5–5.1)
RBC # BLD AUTO: 3.38 10*6/UL (ref 4.1–5.1)
RBC #/AREA URNS HPF: (no result) RBC/HPF (ref 0–2)
SODIUM SERPL-SCNC: 139 MMOL/L (ref 136–145)
SP GR UR: 1.02 (ref 1–1.03)
UROBILINOGEN UR STRIP-MCNC: 0.2 E.U./DL (ref 0–1)
WBC #/AREA URNS HPF: (no result) WBC/HPF (ref 0–5)
WBC NRBC COR # BLD AUTO: 6.8 10*3/UL (ref 4.8–10.8)

## 2021-11-05 RX ORDER — NADOLOL 40 MG/1
TABLET ORAL
Qty: 90 TABLET | Refills: 3 | Status: SHIPPED
Start: 2021-11-05 | End: 2022-10-06

## 2021-11-22 ENCOUNTER — HOSPITAL ENCOUNTER (OUTPATIENT)
Dept: HOSPITAL 83 - LAB | Age: 66
Discharge: HOME | End: 2021-11-22
Attending: INTERNAL MEDICINE
Payer: MEDICARE

## 2021-11-22 DIAGNOSIS — Z79.899: ICD-10-CM

## 2021-11-22 DIAGNOSIS — I48.0: ICD-10-CM

## 2021-11-22 DIAGNOSIS — Z51.81: Primary | ICD-10-CM

## 2021-11-22 LAB
BUN SERPL-MCNC: 30 MG/DL (ref 7–24)
CHLORIDE SERPL-SCNC: 108 MMOL/L (ref 98–107)
CREAT SERPL-MCNC: 1.22 MG/DL (ref 0.55–1.02)
POTASSIUM SERPL-SCNC: 4.4 MMOL/L (ref 3.5–5.1)
SODIUM SERPL-SCNC: 139 MMOL/L (ref 136–145)

## 2021-12-07 ENCOUNTER — TELEPHONE (OUTPATIENT)
Dept: ADMINISTRATIVE | Age: 66
End: 2021-12-07

## 2021-12-07 ENCOUNTER — TELEPHONE (OUTPATIENT)
Dept: CARDIOLOGY CLINIC | Age: 66
End: 2021-12-07

## 2021-12-07 NOTE — TELEPHONE ENCOUNTER
Having a colonoscopy and has a question regarding meds she was supposed to have stopped.   [phone]

## 2021-12-07 NOTE — TELEPHONE ENCOUNTER
Patient is scheduled for a colonoscopy on Friday 12/10 and she said per you instructions she was supposed to hold coumadin starting Sunday and start lovenox today, she was confused and took coumadin Sunday and would like to know if she should start lovenox today or tomorrow, please advise

## 2022-03-25 ENCOUNTER — HOSPITAL ENCOUNTER (OUTPATIENT)
Dept: HOSPITAL 83 - LAB | Age: 67
Discharge: HOME | End: 2022-03-25
Attending: INTERNAL MEDICINE
Payer: MEDICARE

## 2022-03-25 DIAGNOSIS — N25.81: ICD-10-CM

## 2022-03-25 DIAGNOSIS — D63.1: ICD-10-CM

## 2022-03-25 DIAGNOSIS — Z79.899: ICD-10-CM

## 2022-03-25 DIAGNOSIS — N18.31: Primary | ICD-10-CM

## 2022-03-25 LAB
ABSOLUTE BASO #: 0.1 10*3/UL (ref 0–0.1)
ABSOLUTE EOS #: 0.3 10*3/UL (ref 0–0.4)
ABSOLUTE NEUT #: 4.9 10*3/UL (ref 2.3–7.9)
ALBUMIN: 3.6 GM/DL (ref 3.1–4.5)
BACTERIA #/AREA URNS HPF: (no result) /[HPF]
BACTERIA, URINE: ABNORMAL
BASOPHILS # BLD AUTO: 0.1 10*3/UL (ref 0–0.1)
BASOPHILS %: 0.9 % (ref 0–1)
BASOPHILS NFR BLD AUTO: 0.9 % (ref 0–1)
BILIRUBIN: NEGATIVE
BLOOD: ABNORMAL
BUN BLDV-MCNC: 28 MG/DL (ref 7–24)
BUN SERPL-MCNC: 28 MG/DL (ref 7–24)
CALCIUM SERPL-MCNC: 9.8 MG/DL (ref 8.5–10.5)
CASTS URNS QL MICRO: (no result)
CHLORIDE BLD-SCNC: 106 MMOL/L (ref 98–107)
CHLORIDE SERPL-SCNC: 106 MMOL/L (ref 98–107)
CLARITY: ABNORMAL
CO2: 32 MMOL/L (ref 21–32)
COLOR: ABNORMAL
COMMENT: YES
CREAT SERPL-MCNC: 1.33 MG/DL (ref 0.55–1.02)
CREAT SERPL-MCNC: 1.33 MG/DL (ref 0.55–1.02)
CREAT UR-MCNC: 155 MG/DL
CREATININE, RANDOM URINE: 155 MG/DL
EOSINOPHIL # BLD AUTO: 0.3 10*3/UL (ref 0–0.4)
EOSINOPHIL # BLD AUTO: 3.8 % (ref 1–4)
EOSINOPHILS %: 3.8 % (ref 1–4)
EPI CELLS #/AREA URNS HPF: (no result) /[HPF]
EPITHELIAL CELLS, UA: ABNORMAL
ERYTHROCYTE [DISTWIDTH] IN BLOOD BY AUTOMATED COUNT: 13 % (ref 0–14.5)
FERRITIN SERPL-MCNC: 73.6 NG/ML (ref 10–291)
FERRITIN: 73.6 NG/ML (ref 10–291)
GFR AFRICAN AMERICAN: 48 ML/MIN
GFR SERPL CREATININE-BSD FRML MDRD: 40 ML/MIN/
GLUCOSE: 91 MG/DL (ref 65–99)
GLUCOSE: NEGATIVE
HCT VFR BLD AUTO: 34.8 % (ref 37–47)
HCT VFR BLD CALC: 34.8 % (ref 37–47)
HEMOGLOBIN: 10.9 G/DL (ref 12–16)
HYALINE CASTS: ABNORMAL
IMMATURE GRANULOCYTES #: 0 10*3/UL (ref 0–0.1)
IMMATURE GRANULOCYTES: 0.3 % (ref 0–1)
IRON SATURATION: 22 %
IRON SERPL-MCNC: 76 UG/DL (ref 50–170)
IRON: 76 UG/DL (ref 50–170)
KETONES: ABNORMAL
LEUKOCYTE ESTERASE UR QL STRIP: (no result)
LEUKOCYTE ESTERASE, URINE: ABNORMAL
LYMPHOCYTE %: 12.6 % (ref 27–41)
LYMPHOCYTES # BLD AUTO: 0.9 10*3/UL (ref 1.3–4.4)
LYMPHOCYTES # BLD: 0.9 10*3/UL (ref 1.3–4.4)
LYMPHOCYTES NFR BLD AUTO: 12.6 % (ref 27–41)
MAGNESIUM: 2.6 MG/DL (ref 1.5–2.1)
MCH RBC QN AUTO: 31.7 PG (ref 27–31)
MCH RBC QN AUTO: 31.7 PG (ref 27–31)
MCHC RBC AUTO-ENTMCNC: 31.3 G/DL (ref 33–37)
MCHC RBC AUTO-ENTMCNC: 31.3 G/DL (ref 33–37)
MCV RBC AUTO: 101.2 FL (ref 81–99)
MCV RBC AUTO: 101.2 FL (ref 81–99)
MONOCYTES # BLD AUTO: 0.6 10*3/UL (ref 0.1–1)
MONOCYTES # BLD: 0.6 10*3/UL (ref 0.1–1)
MONOCYTES %: 9.5 % (ref 3–9)
MONOCYTES NFR BLD MANUAL: 9.5 % (ref 3–9)
NEUT #: 4.9 10*3/UL (ref 2.3–7.9)
NEUT %: 72.9 % (ref 47–73)
NEUTROPHILS %: 72.9 % (ref 47–73)
NITRITE, URINE: NEGATIVE
NRBC BLD QL AUTO: 0 % (ref 0–0)
NUCLEATED RED BLOOD CELLS: 0 % (ref 0–0)
PDW BLD-RTO: 13 % (ref 0–14.5)
PH UR STRIP: 6 [PH] (ref 4.5–8)
PH, URINE: 6 (ref 4.5–8)
PHOSPHORUS: 4.7 MG/DL (ref 2.5–4.9)
PLATELET # BLD AUTO: 164 10*3/UL (ref 130–400)
PLATELET # BLD: 164 10*3/UL (ref 130–400)
PMV BLD AUTO: 10.5 FL (ref 9.6–12.3)
PMV BLD AUTO: 10.5 FL (ref 9.6–12.3)
POTASSIUM SERPL-SCNC: 5.3 MMOL/L (ref 3.5–5.1)
POTASSIUM SERPL-SCNC: 5.3 MMOL/L (ref 3.5–5.1)
PROTEIN UA: ABNORMAL
PROTEIN/CREAT RATIO URINE RAN: 0.3
PTH INTACT: 44.5 PG/ML (ref 18.5–88)
RBC # BLD AUTO: 3.44 10*6/UL (ref 4.1–5.1)
RBC # BLD: 3.44 10*6/UL (ref 4.1–5.1)
RBC #/AREA URNS HPF: (no result) RBC/HPF (ref 0–2)
RBC UA: ABNORMAL RBC/HPF (ref 0–2)
SODIUM BLD-SCNC: 140 MMOL/L (ref 136–145)
SODIUM SERPL-SCNC: 140 MMOL/L (ref 136–145)
SP GR UR: 1.02 (ref 1–1.03)
SPECIFIC GRAVITY UA: 1.02 (ref 1–1.03)
TIBC SERPL-MCNC: 334 UG/DL (ref 250–450)
TOTAL IRON BINDING CAPACITY: 334 UG/DL (ref 250–450)
UNSATURATED IRON BINDING CAPACITY: 258 UG/DL (ref 110–365)
URINE TOTAL PROTEIN CREATININE RATIO: 47.4 MG/DL
UROBILINOGEN UR STRIP-MCNC: 0.2 E.U./DL (ref 0–1)
UROBILINOGEN, URINE: 0.2 E.U./DL (ref 0–1)
WBC # BLD: 6.8 10*3/UL (ref 4.8–10.8)
WBC #/AREA URNS HPF: (no result) WBC/HPF (ref 0–5)
WBC NRBC COR # BLD AUTO: 6.8 10*3/UL (ref 4.8–10.8)
WBC URINE: ABNORMAL WBC/HPF (ref 0–5)

## 2022-03-27 LAB — URINE CULTURE, ROUTINE: NORMAL

## 2022-09-19 RX ORDER — DOFETILIDE 0.25 MG/1
250 CAPSULE ORAL 2 TIMES DAILY
COMMUNITY
End: 2022-09-19 | Stop reason: SDUPTHER

## 2022-09-19 RX ORDER — DOFETILIDE 0.25 MG/1
250 CAPSULE ORAL 2 TIMES DAILY
Qty: 180 CAPSULE | Refills: 0 | Status: SHIPPED | OUTPATIENT
Start: 2022-09-19

## 2022-09-26 ENCOUNTER — HOSPITAL ENCOUNTER (OUTPATIENT)
Dept: HOSPITAL 83 - LAB | Age: 67
Discharge: HOME | End: 2022-09-26
Attending: INTERNAL MEDICINE
Payer: MEDICARE

## 2022-09-26 DIAGNOSIS — Z79.899: ICD-10-CM

## 2022-09-26 DIAGNOSIS — N25.81: ICD-10-CM

## 2022-09-26 DIAGNOSIS — N18.31: Primary | ICD-10-CM

## 2022-09-26 DIAGNOSIS — D63.1: ICD-10-CM

## 2022-09-26 LAB
25(OH)D3 SERPL-MCNC: 39.3 NG/ML (ref 30–100)
BACTERIA #/AREA URNS HPF: (no result) /[HPF]
BASOPHILS # BLD AUTO: 0.1 10*3/UL (ref 0–0.1)
BASOPHILS NFR BLD AUTO: 0.6 % (ref 0–1)
BUN SERPL-MCNC: 18 MG/DL (ref 7–24)
CASTS URNS QL MICRO: (no result)
CHLORIDE SERPL-SCNC: 102 MMOL/L (ref 98–107)
CREAT SERPL-MCNC: 1.05 MG/DL (ref 0.55–1.02)
CREAT UR-MCNC: 335 MG/DL
EOSINOPHIL # BLD AUTO: 0.3 10*3/UL (ref 0–0.4)
EOSINOPHIL # BLD AUTO: 3.3 % (ref 1–4)
EPI CELLS #/AREA URNS HPF: (no result) /[HPF]
ERYTHROCYTE [DISTWIDTH] IN BLOOD BY AUTOMATED COUNT: 13.1 % (ref 0–14.5)
FERRITIN SERPL-MCNC: 92.2 NG/ML (ref 10–291)
HCT VFR BLD AUTO: 34 % (ref 37–47)
IRON SERPL-MCNC: 62 UG/DL (ref 50–170)
LEUKOCYTE ESTERASE UR QL STRIP: (no result)
LYMPHOCYTES # BLD AUTO: 0.6 10*3/UL (ref 1.3–4.4)
LYMPHOCYTES NFR BLD AUTO: 7.8 % (ref 27–41)
MCH RBC QN AUTO: 31.8 PG (ref 27–31)
MCHC RBC AUTO-ENTMCNC: 31.2 G/DL (ref 33–37)
MCV RBC AUTO: 102.1 FL (ref 81–99)
MONOCYTES # BLD AUTO: 0.7 10*3/UL (ref 0.1–1)
MONOCYTES NFR BLD MANUAL: 9 % (ref 3–9)
NEUT #: 6.4 10*3/UL (ref 2.3–7.9)
NEUT %: 78.8 % (ref 47–73)
NRBC BLD QL AUTO: 0 10*3/UL (ref 0–0)
PH UR STRIP: 6 [PH] (ref 4.5–8)
PLATELET # BLD AUTO: 202 10*3/UL (ref 130–400)
PMV BLD AUTO: 9.9 FL (ref 9.6–12.3)
POTASSIUM SERPL-SCNC: 4.2 MMOL/L (ref 3.5–5.1)
RBC # BLD AUTO: 3.33 10*6/UL (ref 4.1–5.1)
SODIUM SERPL-SCNC: 142 MMOL/L (ref 136–145)
SP GR UR: 1.02 (ref 1–1.03)
UROBILINOGEN UR STRIP-MCNC: 1 E.U./DL (ref 0–1)
WBC #/AREA URNS HPF: (no result) WBC/HPF (ref 0–5)
WBC NRBC COR # BLD AUTO: 8.1 10*3/UL (ref 4.8–10.8)

## 2022-10-06 RX ORDER — NADOLOL 40 MG/1
TABLET ORAL
Qty: 90 TABLET | Refills: 3 | Status: SHIPPED | OUTPATIENT
Start: 2022-10-06

## 2022-11-14 DIAGNOSIS — Z79.899 VISIT FOR MONITORING TIKOSYN THERAPY: Primary | ICD-10-CM

## 2022-11-14 DIAGNOSIS — I50.9 CONGESTIVE HEART FAILURE, UNSPECIFIED HF CHRONICITY, UNSPECIFIED HEART FAILURE TYPE (HCC): Primary | ICD-10-CM

## 2022-11-14 DIAGNOSIS — Z51.81 VISIT FOR MONITORING TIKOSYN THERAPY: Primary | ICD-10-CM

## 2022-11-15 RX ORDER — DOFETILIDE 0.25 MG/1
CAPSULE ORAL
Qty: 180 CAPSULE | Refills: 1 | Status: SHIPPED | OUTPATIENT
Start: 2022-11-15

## 2022-11-17 ENCOUNTER — OFFICE VISIT (OUTPATIENT)
Dept: NON INVASIVE DIAGNOSTICS | Age: 67
End: 2022-11-17
Payer: COMMERCIAL

## 2022-11-17 VITALS
OXYGEN SATURATION: 97 % | DIASTOLIC BLOOD PRESSURE: 66 MMHG | SYSTOLIC BLOOD PRESSURE: 126 MMHG | WEIGHT: 231.8 LBS | RESPIRATION RATE: 16 BRPM | HEIGHT: 65 IN | BODY MASS INDEX: 38.62 KG/M2

## 2022-11-17 DIAGNOSIS — I27.20 PULMONARY HYPERTENSION (HCC): Primary | ICD-10-CM

## 2022-11-17 PROCEDURE — 1123F ACP DISCUSS/DSCN MKR DOCD: CPT | Performed by: INTERNAL MEDICINE

## 2022-11-17 PROCEDURE — 99214 OFFICE O/P EST MOD 30 MIN: CPT | Performed by: INTERNAL MEDICINE

## 2022-11-17 PROCEDURE — G8417 CALC BMI ABV UP PARAM F/U: HCPCS | Performed by: INTERNAL MEDICINE

## 2022-11-17 PROCEDURE — G8427 DOCREV CUR MEDS BY ELIG CLIN: HCPCS | Performed by: INTERNAL MEDICINE

## 2022-11-17 PROCEDURE — 93000 ELECTROCARDIOGRAM COMPLETE: CPT | Performed by: INTERNAL MEDICINE

## 2022-11-17 PROCEDURE — G8484 FLU IMMUNIZE NO ADMIN: HCPCS | Performed by: INTERNAL MEDICINE

## 2022-11-17 PROCEDURE — G8400 PT W/DXA NO RESULTS DOC: HCPCS | Performed by: INTERNAL MEDICINE

## 2022-11-17 PROCEDURE — 1036F TOBACCO NON-USER: CPT | Performed by: INTERNAL MEDICINE

## 2022-11-17 PROCEDURE — 1090F PRES/ABSN URINE INCON ASSESS: CPT | Performed by: INTERNAL MEDICINE

## 2022-11-17 PROCEDURE — 3017F COLORECTAL CA SCREEN DOC REV: CPT | Performed by: INTERNAL MEDICINE

## 2022-11-17 NOTE — PROGRESS NOTES
Cardiac Electrophysiology Outpatient Progress Note    Lavinia Bosworth  1955  Date of Service: 12/12/2022  Referring Provider/PCP: Magan Salvador DO  Chief Complaint:   Chief Complaint   Patient presents with    Hypertension     Paroxysmal atrial fibrillation, monitoring of antiarrhythmic drug therapy         Patient Active Problem List    Diagnosis Date Noted    Atrial fibrillation or flutter 11/14/2012     Priority: High    Valvular disease      Priority: Low     Overview Note:     Replacing deprecated diagnoses      Pulmonary hypertension ([facility]      Priority: Low    CHF (congestive heart failure) ([facility]      Priority: Low     PMH    1. Mitral valve disease, mitral valve stenosis and regurgitation dating back to 1996.  2. Mitral valve repair surgery initially in 1996 followed by replacement surgery in 2009. 3. Persistent and recurrent atrial fibrillation and atrial flutter. The patient has undergone an intraoperative   maze procedure for the same. Subsequently, she has also undergone catheter based radiofrequency ablation   procedures for the left atrial flutter and fibrillation. 4. Chronic dofetilide therapy thereafter for recurrent atrial arrhythmia. 5. Obesity. 6. Sleep apnea. Restrictive lung disease diagnosed a few years ago. The patient is now on supplemental O2   by nasal cannula 24/7.   7. Renal insufficiency that has developed over the past year and therefore her dose of dofetilide was reduced in   November to 250 mcg twice a day.     Current Outpatient Medications   Medication Sig Dispense Refill    dofetilide (TIKOSYN) 250 MCG capsule TAKE 1 CAPSULE BY MOUTH  TWICE DAILY 180 capsule 1    nadolol (CORGARD) 40 MG tablet TAKE 1 TABLET BY MOUTH  DAILY 90 tablet 3    lisinopril (PRINIVIL;ZESTRIL) 5 MG tablet TAKE 1 TABLET BY MOUTH  DAILY (Patient taking differently: Take 10 mg by mouth daily Pt taking 5mg daily.) 90 tablet 3    LORazepam (ATIVAN) 0.5 MG tablet ONE TABLET EVERY 8 TO 12 HOURS WHEN NEEDED ATIVAN      aspirin 81 MG EC tablet Take 81 mg by mouth daily      furosemide (LASIX) 20 MG tablet Take 1 tablet by mouth 2 times daily (Patient taking differently: Take 20 mg by mouth daily) 180 tablet 3    sertraline (ZOLOFT) 100 MG tablet Take 200 mg by mouth daily Instructed to take am of procedure      montelukast (SINGULAIR) 10 MG tablet Take 10 mg by mouth nightly   3    OXYGEN Inhale into the lungs 3 l nasal cannula continous      Budesonide-Formoterol Fumarate (SYMBICORT IN) Inhale 2 puffs into the lungs 2 times daily       warfarin (COUMADIN) 6 MG tablet Take 5.5 mg by mouth daily Pt alternating 6mg and 6.5 mg weekly. Multiple Vitamin (MULTIVITAMIN PO) Take 1 tablet by mouth       potassium chloride (KLOR-CON M20) 20 MEQ extended release tablet TAKE 1 TABLET DAILY (Patient not taking: Reported on 11/17/2022) 90 tablet 3    albuterol sulfate HFA (PROVENTIL;VENTOLIN;PROAIR) 108 (90 Base) MCG/ACT inhaler Inhale 2 puffs into the lungs every 4 hours as needed for Wheezing  (Patient not taking: Reported on 11/17/2022)       No current facility-administered medications for this visit. No Known Allergies    SUBJECTIVE: Solange Herring presents follow-up on dofetilide therapy. The patient has a history of mitral valve disease and paroxysmal atrial fibrillation for which she has undergone multiple interventions in the past.  She is maintained on dofetilide therapy and remains free of symptoms of atrial arrhythmias. .  No chest pain or shortness of breath. No history of symptoms of paroxysmal nocturnal dyspnea, orthopnea or leg edema. She is losing weight and is feeling improved. ROS:   Constitutional: Negative for fever, activity change and appetite change. HENT: Negative for epistaxis, difficulty swallowing. Eyes: Negative for blurred vision or double vision. Respiratory: Negative for cough, chest tightness, shortness of breath and wheezing.    Cardiovascular: Negative for chest pain, palpitations and leg swelling. Gastrointestinal: Negative for abdominal pain, heartburn, or blood in stool. Genitourinary: Negative for hematuria, burning or frequency. Musculoskeletal: Negative for myalgias, stiffness, or swelling. Skin: Negative for rash, pain, or lumps. Neurological: Negative for syncope, seizures, or headaches. Psychiatric/Behavioral: Negative for confusion and agitation. The patient is not nervous/anxious. PHYSICAL EXAM:  Vitals:    11/17/22 1438   BP: 126/66   Resp: 16   SpO2: 97%   Weight: 231 lb 12.8 oz (105.1 kg)   Height: 5' 5\" (1.651 m)     Constitutional: Oriented to person, place, and time. Well-developed and cooperative. Head: Normocephalic and atraumatic. Eyes: Conjunctivae are normal. Pupils are equal, round, and reactive to light. Neck: No hepatojugular reflux and no JVD present. Cardiovascular: Normally placed PMI, normal S1 and S2 with left sternal border and the apex  Pulmonary/Chest: Effort normal and breath sounds normal. No respiratory distress. Abdominal: Soft. Normal appearance and nondistended  Musculoskeletal: Normal range of motion of all extremities, no muscle weakness. Neurological: Alert and oriented to person, place, and time. Gait normal.   Skin: Skin is warm and dry. No bruising, no ecchymosis and no rash noted. Extremity: No clubbing or cyanosis. No edema. Psychiatric: Normal mood and affect.  Thought content normal.     Pertinent Labs:  CBC:   WBC   Date Value Ref Range Status   03/25/2022 6.8 4.8 - 10.8 10*3/uL Final   11/19/2020 6.5 4.8 - 10.8 10*3/uL Final   01/19/2012 6.4 4.5 - 11.5 E9/L Final     Hemoglobin   Date Value Ref Range Status   03/25/2022 10.9 (L) 12.0 - 16.0 g/dl Final   11/19/2020 11.9 (L) 12.0 - 16.0 g/dl Final     HGB   Date Value Ref Range Status   01/19/2012 12.1 11.5 - 15.5 g/dL Final   12/30/2011 12.7 11.5 - 15.5 g/dL Final   10/08/2010 12.6 11.5 - 15.5 g/dL Final     Hematocrit   Date Value Ref Range Status 03/25/2022 34.8 (L) 37.0 - 47.0 % Final   11/19/2020 37.2 37.0 - 47.0 % Final     HCT   Date Value Ref Range Status   01/19/2012 35.7 34.0 - 48.0 % Final   12/30/2011 37.0 34.0 - 48.0 % Final   10/08/2010 37.2 34.0 - 48.0 % Final     Platelets   Date Value Ref Range Status   03/25/2022 164 130 - 400 10*3/uL Final   11/19/2020 159 130 - 400 10*3/uL Final   01/19/2012 137 130 - 450 E9/L Final     BMP:   Lab Results   Component Value Date/Time     03/25/2022 01:47 PM    K 5.3 03/25/2022 01:47 PM     03/25/2022 01:47 PM    CO2 32 03/25/2022 01:47 PM    BUN 28 03/25/2022 01:47 PM    CREATININE 1.33 03/25/2022 01:47 PM    GLUCOSE Negative 03/25/2022 01:50 PM    CALCIUM 9.8 03/25/2022 01:47 PM      Serum creatinine--1.05 in September 2022      ABGs: No results found for: PHART, PO2ART, YEI8BGY  INR:   Lab Results   Component Value Date    INR 4.5 11/14/2012    INR 3.6 06/12/2012    INR 3.5 01/21/2012     BNP:   Lab Results   Component Value Date     (H) 01/19/2012     TSH:   Lab Results   Component Value Date    TSH 1.320 11/19/2020      Cardiac Injury Profile: Total CK   Date Value Ref Range Status   10/08/2010 59 33 - 211 U/L Final     CK-MB   Date Value Ref Range Status   10/08/2010 1.4 0.0 - 5.0 ng/mL Final     Comment:     CKMB is best interpreted in combination with a concurrent  Total CK as CKMB Index- CKMB/CK Total X 100 = %  Index > 2.5 is suggestive of myocardial damage. Total CK-      CKMB Index-      Origin of CK-  >=200          <2.5%            Skeletal Muscle                 >2.5%            Myocardium    Note- <100 Total CK - CKMB Index is of uncertain        predictive clinical value. Troponin I   Date Value Ref Range Status   10/08/2010 <0.01 0.00 - 0.40 ng/mL Final     Comment:     0.05 - 0.78 Borderline Elevated Troponin I. Possible              myocardial injury. Repeat testing in 2-4 hours.               Serial determinations may aid in evaluation of              evolving myocardial injury.       > 0.78 Elevated Troponin I consistent with recent              myocardial injury. In the absence of external              chest trauma values in this range suggest              myocardial ischemia. Lipid Profile:   Lab Results   Component Value Date/Time    TRIG 88 11/19/2020 03:52 PM    HDL 67 11/19/2020 03:52 PM    CHOL 199 11/19/2020 03:52 PM      Hemoglobin A1C:   Lab Results   Component Value Date    LABA1C 5.8 (H) 11/19/2020         Pertinent Cardiac Testing:     ECG 12/12/2022: NSR , QTc <500 msec    I have independently reviewed all of the ECGs and rhythm strips per above  I have personally reviewed the laboratory, cardiac diagnostic and radiographic testing as outlined above:      Impression:     1. Paroxysmal atrial fibrillation--s/p PVI procedures, catheter based and intraoperative. Recurrent atrial tachycardia led to treatment with dofetilide. 2.  Monitoring of antiarrhythmic drug therapy. The patient has been on dofetilide 500 mcg twice a day but the dose was reduced in 2021 with worsening of her renal function. 3.  Mitral  valve disease--status post MVR in 2009    4. Hypertension, longstanding    5. Hypoxemia--patient on supplemental O2 by nasal cannula    6. Obesity    Recommendations:    1. Continue dofetilide at present dosage  2. Monitor renal function twice a year  3. Risk factor modification with weight loss and treatment of sleep apnea as needed  4. Follow-up in 6 months    All of the above was discussed with the patient and her  ,>50% of the time involved face-to-face time providing counseling and or coordination of care with the other providers, preparation for the clinic visit, reviewing records/tests, counseling/education of the patient, ordering medications/tests/procedures, coordinating care, and documenting clinical information in the EHR. Thank you for allowing me to participate in your patient's care.     Darrell Kasper MD, [facility]  Cardiac Electrophysiology  [facility] Physicians  [facility] [place]: [facility]

## 2022-12-21 PROBLEM — N18.9 ANEMIA IN CHRONIC KIDNEY DISEASE: Status: ACTIVE | Noted: 2022-03-25

## 2022-12-21 PROBLEM — N18.31 CHRONIC KIDNEY DISEASE, STAGE 3A (HCC): Status: ACTIVE | Noted: 2022-03-25

## 2022-12-21 PROBLEM — D63.1 ANEMIA IN CHRONIC KIDNEY DISEASE: Status: ACTIVE | Noted: 2022-03-25

## 2022-12-21 PROBLEM — N25.81 SECONDARY HYPERPARATHYROIDISM OF RENAL ORIGIN (HCC): Status: ACTIVE | Noted: 2022-03-25

## 2022-12-22 ENCOUNTER — OFFICE VISIT (OUTPATIENT)
Dept: CARDIOLOGY CLINIC | Age: 67
End: 2022-12-22

## 2022-12-22 VITALS
HEIGHT: 65 IN | WEIGHT: 229 LBS | SYSTOLIC BLOOD PRESSURE: 102 MMHG | RESPIRATION RATE: 20 BRPM | HEART RATE: 74 BPM | BODY MASS INDEX: 38.15 KG/M2 | DIASTOLIC BLOOD PRESSURE: 68 MMHG

## 2022-12-22 DIAGNOSIS — Z09 FOLLOW-UP EXAM: Primary | ICD-10-CM

## 2022-12-22 NOTE — PROGRESS NOTES
CHIEF COMPLAINT: MVR/Afib    HISTORY OF PRESENT ILLNESS: Patient is a 75 y.o. female seen at the request of Melissa Lomeli DO. Patient with history of MVR with OLIVER and LE edema issues. No CP or angina.      Past Medical History:   Diagnosis Date    Atrial fibrillation (HCC)     Atrial flutter (HCC)     CAD (coronary artery disease)     CHF (congestive heart failure) ([facility]     10/17/2011-echo revealed an estimated LVEF of 55-60%    COPD (chronic obstructive pulmonary disease) (HCC)     OLIVER (dyspnea on exertion)     Hypertension     Palpitations     Pulmonary hypertension (HCC)     SOB (shortness of breath)     Valvular disease        Patient Active Problem List   Diagnosis    Valvular disease    Pulmonary hypertension (HCC)    CHF (congestive heart failure) (HCC)    Atrial fibrillation or flutter    Chronic kidney disease, stage 3a ([facility])    Anemia in chronic kidney disease    Secondary hyperparathyroidism of renal origin ([facility]       Allergies   Allergen Reactions    Latex      Other reaction(s): U       Current Outpatient Medications   Medication Sig Dispense Refill    dofetilide (TIKOSYN) 250 MCG capsule TAKE 1 CAPSULE BY MOUTH  TWICE DAILY 180 capsule 1    nadolol (CORGARD) 40 MG tablet TAKE 1 TABLET BY MOUTH  DAILY 90 tablet 3    lisinopril (PRINIVIL;ZESTRIL) 10 MG tablet TAKE 1 TABLET BY MOUTH  DAILY (Patient taking differently: Take 5 mg by mouth daily Pt taking 5mg daily.) 90 tablet 3    LORazepam (ATIVAN) 0.5 MG tablet ONE TABLET EVERY 8 TO 12 HOURS WHEN NEEDED ATIVAN      aspirin 81 MG EC tablet Take 81 mg by mouth daily      furosemide (LASIX) 20 MG tablet Take 1 tablet by mouth 2 times daily (Patient taking differently: Take 20 mg by mouth daily) 180 tablet 3    sertraline (ZOLOFT) 100 MG tablet Take 200 mg by mouth daily Instructed to take am of procedure      montelukast (SINGULAIR) 10 MG tablet Take 10 mg by mouth nightly   3    OXYGEN Inhale into the lungs 3 l nasal cannula continous Budesonide-Formoterol Fumarate (SYMBICORT IN) Inhale 2 puffs into the lungs 2 times daily       warfarin (COUMADIN) 6 MG tablet Take 5.5 mg by mouth daily Pt alternating 6mg and 6.5 mg weekly. Multiple Vitamin (MULTIVITAMIN PO) Take 1 tablet by mouth        No current facility-administered medications for this visit. Social History     Socioeconomic History    Marital status:      Spouse name: Not on file    Number of children: Not on file    Years of education: Not on file    Highest education level: Not on file   Occupational History    Not on file   Tobacco Use    Smoking status: Former     Packs/day: 2.00     Years: 10.00     Pack years: 20.00     Types: Cigarettes     Quit date: 1988     Years since quittin.9    Smokeless tobacco: Never   Vaping Use    Vaping Use: Never used   Substance and Sexual Activity    Alcohol use: No    Drug use: No    Sexual activity: Not on file   Other Topics Concern    Not on file   Social History Narrative    Not on file     Social Determinants of Health     Financial Resource Strain: Not on file   Food Insecurity: Not on file   Transportation Needs: Not on file   Physical Activity: Not on file   Stress: Not on file   Social Connections: Not on file   Intimate Partner Violence: Not on file   Housing Stability: Not on file       Family History   Problem Relation Age of Onset    Diabetes Mother      Review of Systems:  Heart: as above   Lungs: as above   Eyes: denies changes in vision or discharge. Ears: denies changes in hearing or pain. Nose: denies epistaxis or masses   Throat: denies sore throat or trouble swallowing. Neuro: denies numbness, tingling, tremors. Skin: denies rashes or itching. : denies hematuria, dysuria   GI: denies vomiting, diarrhea   Psych: denies mood changed, anxiety, depression. All other systems negative.     Physical Exam   /68   Pulse 74   Resp 20   Ht 5' 5\" (1.651 m)   Wt 229 lb (103.9 kg)   BMI 38.11 kg/m²   Constitutional: Oriented to person, place, and time. Well-developed and well-nourished. No distress. Head: Normocephalic and atraumatic. Eyes: EOM are normal. Pupils are equal, round, and reactive to light. Neck: Normal range of motion. Neck supple. No hepatojugular reflux and no JVD present. Carotid bruit is not present. No tracheal deviation present. No thyromegaly present. Cardiovascular: Normal rate, regular rhythm, ESTER 2/6  Pulmonary/Chest: Effort normal and breath sounds normal. No respiratory distress. No wheezes. No rales. No tenderness. Abdominal: Soft. Bowel sounds are normal. No distension and no mass. No tenderness. No rebound and no guarding. Musculoskeletal: Normal range of motion. No edema and no tenderness. Lymphadenopathy:   No cervical adenopathy. No groin adenopathy. Neurological: Alert and oriented to person, place, and time. Skin: Skin is warm and dry. No rash noted. Not diaphoretic. No erythema. Psychiatric: Normal mood and affect. Behavior is normal.     EKG:  normal sinus rhythm, nonspecific ST and T waves changes. Echo Summary 3/28/18:   Overall ejection fraction is normal .   Normal right ventricle structure and function. Normally functioning bileaflet mechanical valve in mitral position. Mean transmitral gradient 5 mmHg. Physiologic mitral regurgitation is present. Echo Summary 9/3/2021:   Ejection fraction is visually estimated at 55%. No regional wall motion abnormalities seen. Normal right ventricle structure and function. Left atrial volume index of 30 ml per meters squared BSA. The aortic valve is trileaflet. Mild aortic stenosis is present. The aortic valve area is 1.7 cm2 with a maximum gradient of 22 mmHg and a mean gradient of 12 mmHg. No evidence of aortic valve regurgitation. Mechanical prosthetic valve in the mitral position.    Mean transmitral gradient 11 mmHg. (prior mean was 7 mmHg)   Physiologic and/or trace mitral regurgitation is present. Moderate tricuspid regurgitation. Pulmonary hypertension is mild. RVSP is 44 mmHg. ASSESSMENT AND PLAN:  Patient Active Problem List   Diagnosis    Valvular disease    Pulmonary hypertension (HCC)    CHF (congestive heart failure) (HCC)    Atrial fibrillation or flutter    Chronic kidney disease, stage 3a ([facility]    Anemia in chronic kidney disease    Secondary hyperparathyroidism of renal origin ([facility]     1. Hx of MVR/OLIVER/CHF:    S/P MV replacement with #[address]. Cardiac cath in 2008: normal coronaries     TTE echo 0/3/2021.     2. Paroxysmal Afib/Aflutter: In sinus. Rhythm controlled on tikosyn. On warfarin. S/P multiple ablations last was in 4/10 with Dr. Carlos Pettit in [facility]    Seen locally by Dr. Rui Talavera. 3. COPD: On oxygen. 4. ARLETTE    5. CKD    Philomena Ortiz D.O.   Cardiologist  Cardiology, [facility]

## 2023-01-11 RX ORDER — LISINOPRIL 10 MG/1
5 TABLET ORAL DAILY
Qty: 45 TABLET | Refills: 3 | Status: SHIPPED | OUTPATIENT
Start: 2023-01-11

## 2023-03-23 ENCOUNTER — HOSPITAL ENCOUNTER (OUTPATIENT)
Dept: HOSPITAL 83 - LAB | Age: 68
Discharge: HOME | End: 2023-03-23
Attending: INTERNAL MEDICINE
Payer: MEDICARE

## 2023-03-23 DIAGNOSIS — N18.31: Primary | ICD-10-CM

## 2023-03-23 DIAGNOSIS — N25.81: ICD-10-CM

## 2023-03-23 DIAGNOSIS — D63.1: ICD-10-CM

## 2023-03-23 DIAGNOSIS — Z79.899: ICD-10-CM

## 2023-03-23 LAB
25(OH)D3 SERPL-MCNC: 55.9 NG/ML (ref 30–100)
BACTERIA #/AREA URNS HPF: (no result) /[HPF]
BASOPHILS # BLD AUTO: 0.1 10*3/UL (ref 0–0.1)
BASOPHILS NFR BLD AUTO: 0.8 % (ref 0–1)
BUN SERPL-MCNC: 28 MG/DL (ref 9–23)
CHLORIDE SERPL-SCNC: 100 MMOL/L (ref 98–107)
CREAT UR-MCNC: 209 MG/DL
EOSINOPHIL # BLD AUTO: 0.2 10*3/UL (ref 0–0.4)
EOSINOPHIL # BLD AUTO: 3 % (ref 1–4)
ERYTHROCYTE [DISTWIDTH] IN BLOOD BY AUTOMATED COUNT: 13.3 % (ref 0–14.5)
HCT VFR BLD AUTO: 35.7 % (ref 37–47)
LEUKOCYTE ESTERASE UR QL STRIP: (no result)
LYMPHOCYTES # BLD AUTO: 0.7 10*3/UL (ref 1.3–4.4)
LYMPHOCYTES NFR BLD AUTO: 10.2 % (ref 27–41)
MCH RBC QN AUTO: 32.2 PG (ref 27–31)
MCHC RBC AUTO-ENTMCNC: 31.9 G/DL (ref 33–37)
MCV RBC AUTO: 100.8 FL (ref 81–99)
MONOCYTES # BLD AUTO: 0.6 10*3/UL (ref 0.1–1)
MONOCYTES NFR BLD MANUAL: 8.9 % (ref 3–9)
MUCOUS THREADS URNS QL MICRO: (no result)
NEUT #: 5.5 10*3/UL (ref 2.3–7.9)
NEUT %: 76.8 % (ref 47–73)
NRBC BLD QL AUTO: 0 10*3/UL (ref 0–0)
PH UR STRIP: 5.5 [PH] (ref 4.5–8)
PLATELET # BLD AUTO: 157 10*3/UL (ref 130–400)
PMV BLD AUTO: 11 FL (ref 9.6–12.3)
POTASSIUM SERPL-SCNC: 4.4 MMOL/L (ref 3.4–5.1)
RBC # BLD AUTO: 3.54 10*6/UL (ref 4.1–5.1)
RBC #/AREA URNS HPF: (no result) RBC/HPF (ref 0–2)
SP GR UR: 1.02 (ref 1–1.03)
UROBILINOGEN UR STRIP-MCNC: 1 E.U./DL (ref 0–1)
WBC #/AREA URNS HPF: (no result) WBC/HPF (ref 0–5)
WBC NRBC COR # BLD AUTO: 7.2 10*3/UL (ref 4.8–10.8)

## 2023-08-01 RX ORDER — DOFETILIDE 0.25 MG/1
250 CAPSULE ORAL 2 TIMES DAILY
Qty: 28 CAPSULE | Refills: 0 | Status: SHIPPED | OUTPATIENT
Start: 2023-08-01

## 2023-08-01 RX ORDER — DOFETILIDE 0.25 MG/1
250 CAPSULE ORAL 2 TIMES DAILY
Qty: 180 CAPSULE | Refills: 1 | Status: SHIPPED | OUTPATIENT
Start: 2023-08-01

## 2023-08-01 NOTE — TELEPHONE ENCOUNTER
Patient need two week refill till mail order comes in. Patient has an appointment scheduled for 10/16/2023 having recent labs faxed to office.

## 2023-08-11 RX ORDER — DOFETILIDE 0.25 MG/1
250 CAPSULE ORAL 2 TIMES DAILY
Qty: 60 CAPSULE | Refills: 0 | Status: SHIPPED | OUTPATIENT
Start: 2023-08-11

## 2023-08-11 NOTE — TELEPHONE ENCOUNTER
Patient was notified by mail order pharmacy and they are out of [facility] till August 30th. Patient is asking for a month supply to go to local pharmacy.

## 2023-08-25 ENCOUNTER — TELEPHONE (OUTPATIENT)
Dept: CARDIOLOGY CLINIC | Age: 68
End: 2023-08-25

## 2023-08-25 NOTE — TELEPHONE ENCOUNTER
June Siu has a appointment with you I January, 2024 she wants to know if you want her to have a Echo before she comes in?

## 2023-08-28 DIAGNOSIS — Z95.2 S/P MVR (MITRAL VALVE REPLACEMENT): Primary | ICD-10-CM

## 2023-09-06 RX ORDER — NADOLOL 40 MG/1
TABLET ORAL
Qty: 90 TABLET | Refills: 3 | Status: SHIPPED | OUTPATIENT
Start: 2023-09-06

## 2023-09-13 ENCOUNTER — HOSPITAL ENCOUNTER (OUTPATIENT)
Dept: HOSPITAL 83 - LAB | Age: 68
Discharge: HOME | End: 2023-09-13
Attending: INTERNAL MEDICINE
Payer: MEDICARE

## 2023-09-13 DIAGNOSIS — E78.5: ICD-10-CM

## 2023-09-13 DIAGNOSIS — E55.9: ICD-10-CM

## 2023-09-13 DIAGNOSIS — R79.89: ICD-10-CM

## 2023-09-13 DIAGNOSIS — R53.83: ICD-10-CM

## 2023-09-13 DIAGNOSIS — R74.8: ICD-10-CM

## 2023-09-13 DIAGNOSIS — D63.1: ICD-10-CM

## 2023-09-13 DIAGNOSIS — N18.31: Primary | ICD-10-CM

## 2023-09-13 DIAGNOSIS — N25.81: ICD-10-CM

## 2023-09-13 LAB
25(OH)D3 SERPL-MCNC: 36.3 NG/ML (ref 30–100)
ALP SERPL-CCNC: 58 U/L (ref 46–116)
ALT SERPL W P-5'-P-CCNC: 18 U/L (ref 10–49)
BACTERIA #/AREA URNS HPF: (no result) /[HPF]
BASOPHILS # BLD AUTO: 0 10*3/UL (ref 0–0.1)
BASOPHILS NFR BLD AUTO: 0.4 % (ref 0–1)
BUN SERPL-MCNC: 19 MG/DL (ref 9–23)
CHLORIDE SERPL-SCNC: 101 MMOL/L (ref 98–107)
CHOLEST SERPL-MCNC: 162 MG/DL (ref ?–200)
CREAT UR-MCNC: 25.85 MG/DL
EOSINOPHIL # BLD AUTO: 0.1 10*3/UL (ref 0–0.4)
EOSINOPHIL # BLD AUTO: 1.6 % (ref 1–4)
EPI CELLS #/AREA URNS HPF: (no result) /[HPF]
ERYTHROCYTE [DISTWIDTH] IN BLOOD BY AUTOMATED COUNT: 13.5 % (ref 0–14.5)
GGT SERPL-CCNC: 26 U/L (ref 0–73)
HCT VFR BLD AUTO: 34.6 % (ref 37–47)
LDLC SERPL DIRECT ASSAY-MCNC: 82 MG/DL (ref 9–159)
LEUKOCYTE ESTERASE UR QL STRIP: (no result)
LYMPHOCYTES # BLD AUTO: 0.4 10*3/UL (ref 1.3–4.4)
LYMPHOCYTES NFR BLD AUTO: 5.3 % (ref 27–41)
MCH RBC QN AUTO: 33.1 PG (ref 27–31)
MCHC RBC AUTO-ENTMCNC: 32.1 G/DL (ref 33–37)
MCV RBC AUTO: 103.3 FL (ref 81–99)
MONOCYTES # BLD AUTO: 0.5 10*3/UL (ref 0.1–1)
MONOCYTES NFR BLD MANUAL: 7.1 % (ref 3–9)
NEUT #: 6.5 10*3/UL (ref 2.3–7.9)
NEUT %: 85.3 % (ref 47–73)
NRBC BLD QL AUTO: 0 10*3/UL (ref 0–0)
PH UR STRIP: 7 [PH] (ref 4.5–8)
PLATELET # BLD AUTO: 144 10*3/UL (ref 130–400)
PMV BLD AUTO: 10.8 FL (ref 9.6–12.3)
POTASSIUM SERPL-SCNC: 4.4 MMOL/L (ref 3.4–5.1)
PROT SERPL-MCNC: 7.4 GM/DL (ref 6–8)
RBC # BLD AUTO: 3.35 10*6/UL (ref 4.1–5.1)
RBC #/AREA URNS HPF: (no result) RBC/HPF (ref 0–2)
RETICS/RBC NFR AUTO: 1.12 % (ref 0.5–2.5)
SP GR UR: 1.01 (ref 1–1.03)
T3 SERPL-MCNC: 25.8 % (ref 22.4–36.7)
T4 SERPL-MCNC: 4.5 UG/DL (ref 4.5–10.9)
TRIGL SERPL-MCNC: 70 MG/DL (ref ?–150)
UROBILINOGEN UR STRIP-MCNC: 0.2 E.U./DL (ref 0–1)
WBC NRBC COR # BLD AUTO: 7.6 10*3/UL (ref 4.8–10.8)

## 2023-09-14 RX ORDER — DOFETILIDE 0.25 MG/1
250 CAPSULE ORAL 2 TIMES DAILY
Qty: 60 CAPSULE | Refills: 0 | Status: SHIPPED | OUTPATIENT
Start: 2023-09-14

## 2023-09-14 NOTE — TELEPHONE ENCOUNTER
Patient is still claiming that the mail service does not have Tikosyn. We have asked patient multiple times for her lab work. Called PCP's office and they say patient has orders for labs but has not had them done yet. Patient promises that she will get them drawn this week. Patient is also scheduled for an OV with us on 10/16/2023.

## 2023-10-16 ENCOUNTER — OFFICE VISIT (OUTPATIENT)
Dept: NON INVASIVE DIAGNOSTICS | Age: 68
End: 2023-10-16
Payer: COMMERCIAL

## 2023-10-16 VITALS
HEART RATE: 82 BPM | HEIGHT: 65 IN | DIASTOLIC BLOOD PRESSURE: 70 MMHG | WEIGHT: 215.2 LBS | SYSTOLIC BLOOD PRESSURE: 100 MMHG | OXYGEN SATURATION: 72 % | BODY MASS INDEX: 35.85 KG/M2 | RESPIRATION RATE: 14 BRPM

## 2023-10-16 DIAGNOSIS — I27.20 PULMONARY HYPERTENSION (HCC): Primary | ICD-10-CM

## 2023-10-16 PROCEDURE — 3017F COLORECTAL CA SCREEN DOC REV: CPT | Performed by: INTERNAL MEDICINE

## 2023-10-16 PROCEDURE — G8484 FLU IMMUNIZE NO ADMIN: HCPCS | Performed by: INTERNAL MEDICINE

## 2023-10-16 PROCEDURE — 93000 ELECTROCARDIOGRAM COMPLETE: CPT | Performed by: INTERNAL MEDICINE

## 2023-10-16 PROCEDURE — G8417 CALC BMI ABV UP PARAM F/U: HCPCS | Performed by: INTERNAL MEDICINE

## 2023-10-16 PROCEDURE — 99214 OFFICE O/P EST MOD 30 MIN: CPT | Performed by: INTERNAL MEDICINE

## 2023-10-16 PROCEDURE — G8400 PT W/DXA NO RESULTS DOC: HCPCS | Performed by: INTERNAL MEDICINE

## 2023-10-16 PROCEDURE — 1036F TOBACCO NON-USER: CPT | Performed by: INTERNAL MEDICINE

## 2023-10-16 PROCEDURE — 1090F PRES/ABSN URINE INCON ASSESS: CPT | Performed by: INTERNAL MEDICINE

## 2023-10-16 PROCEDURE — 1123F ACP DISCUSS/DSCN MKR DOCD: CPT | Performed by: INTERNAL MEDICINE

## 2023-10-16 PROCEDURE — G8427 DOCREV CUR MEDS BY ELIG CLIN: HCPCS | Performed by: INTERNAL MEDICINE

## 2023-10-16 NOTE — PROGRESS NOTES
Cardiac Electrophysiology Outpatient Progress Note    Delvis Murcia  1955  Date of Service: 10/16/2023  Referring Provider/PCP: Maxwell Berg DO    Chief Complaint   Patient presents with    Atrial Fibrillation     6 mo f/u. No device. Shortness of Breath           Patient Active Problem List    Diagnosis Date Noted    Atrial fibrillation or flutter 11/14/2012     Priority: High    Chronic kidney disease, stage 3a ([address]) 03/25/2022     Priority: Medium    Anemia in chronic kidney disease 03/25/2022     Priority: Medium    Secondary hyperparathyroidism of renal origin ([address]) 03/25/2022     Priority: Medium    Valvular disease      Priority: Low     Overview Note:     Replacing deprecated diagnoses      Pulmonary hypertension ([address])      Priority: Low    CHF (congestive heart failure) ([address])      Priority: Low     PMH    1. Mitral valve disease, mitral valve stenosis and regurgitation dating back to 1996.  2. Mitral valve repair surgery initially in 1996 followed by replacement surgery in 2009. 3. Persistent and recurrent atrial fibrillation and atrial flutter. The patient has undergone an intraoperative   maze procedure for the same. Subsequently, she has also undergone catheter based radiofrequency ablation   procedures for the left atrial flutter and fibrillation. 4. Chronic dofetilide therapy thereafter for recurrent atrial arrhythmia. 5. Obesity. 6. Sleep apnea. Restrictive lung disease diagnosed a few years ago. The patient is now on supplemental O2   by nasal cannula 24/7.   7. Renal insufficiency that has developed over the past year and therefore her dose of dofetilide was reduced in   November to 250 mcg twice a day.     Current Outpatient Medications   Medication Sig Dispense Refill    dofetilide (TIKOSYN) 250 MCG capsule TAKE 1 CAPSULE BY MOUTH 2 TIMES DAILY 60 capsule 0    nadolol (CORGARD) 40 MG tablet TAKE 1 TABLET BY MOUTH  DAILY 90 tablet 3    lisinopril (PRINIVIL;ZESTRIL) 10

## 2023-10-31 ENCOUNTER — HOSPITAL ENCOUNTER (OUTPATIENT)
Dept: CARDIOLOGY | Age: 68
Discharge: HOME | End: 2023-11-02
Payer: COMMERCIAL

## 2023-10-31 DIAGNOSIS — Z95.2 S/P MVR (MITRAL VALVE REPLACEMENT): ICD-10-CM

## 2023-10-31 PROCEDURE — 93306 TTE W/DOPPLER COMPLETE: CPT

## 2023-11-07 ENCOUNTER — TELEPHONE (OUTPATIENT)
Dept: CARDIOLOGY CLINIC | Age: 68
End: 2023-11-07

## 2023-11-07 NOTE — TELEPHONE ENCOUNTER
Patient had an echo on 10/31 and she was comparing with previous one in 2021, she says previous echo states Pulmonary hypertension is mild. RVSP is 44 mmHg, compare to last week's Pulmonary hypertension is severe. RVSP is 83 mmHg.   Patient is asking if reducing the dose of lisinopril to 5mg daily by nephrologist could've caused the change,she prefers to resume lisinopril 10mg daily, patient also  states that  she is having SOB, please advise

## 2023-11-07 NOTE — TELEPHONE ENCOUNTER
Please make an OV to discuss. Dang Antunez D.O.   Cardiologist  Cardiology, [facility]

## 2024-01-08 RX ORDER — LISINOPRIL 10 MG/1
5 TABLET ORAL DAILY
Qty: 45 TABLET | Refills: 1 | Status: SHIPPED
Start: 2024-01-08 | End: 2024-01-11

## 2024-01-11 ENCOUNTER — OFFICE VISIT (OUTPATIENT)
Dept: CARDIOLOGY CLINIC | Age: 69
End: 2024-01-11

## 2024-01-11 VITALS
RESPIRATION RATE: 18 BRPM | BODY MASS INDEX: 35.16 KG/M2 | SYSTOLIC BLOOD PRESSURE: 96 MMHG | WEIGHT: 211 LBS | HEART RATE: 78 BPM | DIASTOLIC BLOOD PRESSURE: 57 MMHG | HEIGHT: 65 IN

## 2024-01-11 DIAGNOSIS — Z95.2 S/P MVR (MITRAL VALVE REPLACEMENT): Primary | ICD-10-CM

## 2024-01-11 DIAGNOSIS — R06.09 DOE (DYSPNEA ON EXERTION): ICD-10-CM

## 2024-01-11 RX ORDER — METOPROLOL SUCCINATE 25 MG/1
25 TABLET, EXTENDED RELEASE ORAL DAILY
Qty: 90 TABLET | Refills: 3 | Status: SHIPPED | OUTPATIENT
Start: 2024-01-11

## 2024-01-11 RX ORDER — FUROSEMIDE 20 MG/1
20 TABLET ORAL DAILY
Qty: 90 TABLET | Refills: 3 | Status: SHIPPED | OUTPATIENT
Start: 2024-01-11

## 2024-01-11 RX ORDER — LOSARTAN POTASSIUM 25 MG/1
12.5 TABLET ORAL DAILY
Qty: 90 TABLET | Refills: 1 | Status: SHIPPED | OUTPATIENT
Start: 2024-01-11

## 2024-01-11 RX ORDER — SPIRONOLACTONE 25 MG/1
12.5 TABLET ORAL DAILY
Qty: 90 TABLET | Refills: 3 | Status: SHIPPED | OUTPATIENT
Start: 2024-01-11

## 2024-01-11 NOTE — PATIENT INSTRUCTIONS
Stop lisinopril and nadolol.     Start metoprolol 25 mg daily.     Start losartan 12.5 mg once a day.    Start aldactone 12.5 mg daily.     Continue lasix once a day for now.

## 2024-01-11 NOTE — PROGRESS NOTES
Resp 18   Ht 1.651 m (5' 5\")   Wt 95.7 kg (211 lb)   BMI 35.11 kg/m²   Constitutional: Oriented to person, place, and time. Well-developed and well-nourished. No distress.    Head: Normocephalic and atraumatic.   Eyes: EOM are normal. Pupils are equal, round, and reactive to light.   Neck: Normal range of motion. Neck supple. No hepatojugular reflux and no JVD present. Carotid bruit is not present. No tracheal deviation present. No thyromegaly present.   Cardiovascular: Normal rate, regular rhythm, ESTER 2/6  Pulmonary/Chest: Effort normal and breath sounds normal. No respiratory distress. No wheezes. No rales. No tenderness.   Abdominal: Soft. Bowel sounds are normal. No distension and no mass. No tenderness. No rebound and no guarding.   Musculoskeletal: Normal range of motion. No edema and no tenderness.   Lymphadenopathy:   No cervical adenopathy. No groin adenopathy.  Neurological: Alert and oriented to person, place, and time.   Skin: Skin is warm and dry. No rash noted. Not diaphoretic. No erythema.   Psychiatric: Normal mood and affect. Behavior is normal.     EKG:  normal sinus rhythm, nonspecific ST and T waves changes.    Echo Summary 3/28/18:   Overall ejection fraction is normal .   Normal right ventricle structure and function.   Normally functioning bileaflet mechanical valve in mitral position.   Mean transmitral gradient 5 mmHg.   Physiologic mitral regurgitation is present.    Echo Summary 9/3/2021:   Ejection fraction is visually estimated at 55%.   No regional wall motion abnormalities seen.   Normal right ventricle structure and function.   Left atrial volume index of 30 ml per meters squared BSA.   The aortic valve is trileaflet. Mild aortic stenosis is present.   The aortic valve area is 1.7 cm2 with a maximum gradient of 22 mmHg and a mean gradient of 12 mmHg.   No evidence of aortic valve regurgitation.   Mechanical prosthetic valve in the mitral position.   Mean transmitral gradient 11

## 2024-01-14 RX ORDER — DOFETILIDE 0.25 MG/1
250 CAPSULE ORAL 2 TIMES DAILY
Qty: 28 CAPSULE | Refills: 0 | Status: SHIPPED | OUTPATIENT
Start: 2024-01-14 | End: 2024-01-28

## 2024-01-25 ENCOUNTER — HOSPITAL ENCOUNTER (OUTPATIENT)
Dept: HOSPITAL 83 - LAB | Age: 69
Discharge: HOME | End: 2024-01-25
Attending: INTERNAL MEDICINE
Payer: MEDICARE

## 2024-01-25 DIAGNOSIS — R06.09: Primary | ICD-10-CM

## 2024-01-25 DIAGNOSIS — Z95.2: ICD-10-CM

## 2024-01-25 LAB
ABSOLUTE BASO #: 0 10*3/UL (ref 0–0.1)
ABSOLUTE EOS #: 0.2 10*3/UL (ref 0–0.4)
ABSOLUTE NEUT #: 4 10*3/UL (ref 2.3–7.9)
ALBUMIN: 3.6 GM/DL (ref 3.4–5)
ALP BLD-CCNC: 54 U/L (ref 46–116)
ALP SERPL-CCNC: 54 U/L (ref 46–116)
ALT SERPL W P-5'-P-CCNC: 36 U/L (ref 5–49)
ALT SERPL-CCNC: 36 U/L (ref 5–49)
AST SERPL-CCNC: 52 IU/L (ref 0–34)
BASOPHILS # BLD AUTO: 0 10*3/UL (ref 0–0.1)
BASOPHILS %: 0.7 % (ref 0–1)
BASOPHILS NFR BLD AUTO: 0.7 % (ref 0–1)
BILIRUB SERPL-MCNC: 0.4 MG/DL (ref 0.3–1.2)
BUN BLDV-MCNC: 25 MG/DL (ref 9–23)
BUN SERPL-MCNC: 25 MG/DL (ref 9–23)
CALCIUM SERPL-MCNC: 9.8 MD/DL (ref 8.7–10.4)
CHLORIDE BLD-SCNC: 100 MMOL/L (ref 98–107)
CHLORIDE SERPL-SCNC: 100 MMOL/L (ref 98–107)
CO2: 35 MMOL/L (ref 20–31)
CREAT SERPL-MCNC: 0.98 MG/DL (ref 0.55–1.02)
EOSINOPHIL # BLD AUTO: 0.2 10*3/UL (ref 0–0.4)
EOSINOPHIL # BLD AUTO: 3.9 % (ref 1–4)
EOSINOPHILS %: 3.9 % (ref 1–4)
ERYTHROCYTE [DISTWIDTH] IN BLOOD BY AUTOMATED COUNT: 14 % (ref 0–14.5)
GFR AFRICAN AMERICAN: > 60 ML/MIN
GFR SERPL CREATININE-BSD FRML MDRD: 56 ML/MIN/
GLUCOSE: 89 MG/DL (ref 65–99)
HCT VFR BLD AUTO: 35.7 % (ref 37–47)
HCT VFR BLD CALC: 35.7 % (ref 37–47)
HEMOGLOBIN: 10.8 G/DL (ref 12–16)
IMMATURE GRANULOCYTES #: 0 10*3/UL (ref 0–0.1)
IMMATURE GRANULOCYTES: 0.4 % (ref 0–1)
LYMPHOCYTE %: 12.1 % (ref 27–41)
LYMPHOCYTES # BLD AUTO: 0.7 10*3/UL (ref 1.3–4.4)
LYMPHOCYTES # BLD: 0.7 10*3/UL (ref 1.3–4.4)
LYMPHOCYTES NFR BLD AUTO: 12.1 % (ref 27–41)
MAGNESIUM: 2 MG/DL (ref 1.6–2.6)
MCH RBC QN AUTO: 31.2 PG (ref 27–31)
MCH RBC QN AUTO: 31.2 PG (ref 27–31)
MCHC RBC AUTO-ENTMCNC: 30.3 G/DL (ref 33–37)
MCHC RBC AUTO-ENTMCNC: 30.3 G/DL (ref 33–37)
MCV RBC AUTO: 103.2 FL (ref 81–99)
MCV RBC AUTO: 103.2 FL (ref 81–99)
MONOCYTES # BLD AUTO: 0.5 10*3/UL (ref 0.1–1)
MONOCYTES # BLD: 0.5 10*3/UL (ref 0.1–1)
MONOCYTES %: 9.1 % (ref 3–9)
MONOCYTES NFR BLD MANUAL: 9.1 % (ref 3–9)
NEUT #: 4 10*3/UL (ref 2.3–7.9)
NEUT %: 73.8 % (ref 47–73)
NEUTROPHILS %: 73.8 % (ref 47–73)
NRBC BLD QL AUTO: 0 % (ref 0–0)
NUCLEATED RED BLOOD CELLS: 0 % (ref 0–0)
PDW BLD-RTO: 14 % (ref 0–14.5)
PLATELET # BLD AUTO: 132 10*3/UL (ref 130–400)
PLATELET # BLD: 132 10*3/UL (ref 130–400)
PMV BLD AUTO: 10.3 FL (ref 9.6–12.3)
PMV BLD AUTO: 10.3 FL (ref 9.6–12.3)
POTASSIUM SERPL-SCNC: 4.1 MMOL/L (ref 3.4–5.1)
POTASSIUM SERPL-SCNC: 4.1 MMOL/L (ref 3.4–5.1)
PROT SERPL-MCNC: 7.2 GM/DL (ref 6–8)
RBC # BLD AUTO: 3.46 10*6/UL (ref 4.1–5.1)
RBC # BLD: 3.46 10*6/UL (ref 4.1–5.1)
SODIUM BLD-SCNC: 138 MMOL/L (ref 136–145)
TOTAL PROTEIN: 7.2 GM/DL (ref 6–8)
WBC # BLD: 5.4 10*3/UL (ref 4.8–10.8)
WBC NRBC COR # BLD AUTO: 5.4 10*3/UL (ref 4.8–10.8)

## 2024-01-30 ENCOUNTER — TELEPHONE (OUTPATIENT)
Dept: CARDIOLOGY CLINIC | Age: 69
End: 2024-01-30

## 2024-03-01 ENCOUNTER — OFFICE VISIT (OUTPATIENT)
Dept: CARDIOLOGY CLINIC | Age: 69
End: 2024-03-01
Payer: COMMERCIAL

## 2024-03-01 VITALS
BODY MASS INDEX: 35.29 KG/M2 | HEIGHT: 65 IN | DIASTOLIC BLOOD PRESSURE: 56 MMHG | HEART RATE: 79 BPM | RESPIRATION RATE: 16 BRPM | SYSTOLIC BLOOD PRESSURE: 100 MMHG | WEIGHT: 211.8 LBS | OXYGEN SATURATION: 90 %

## 2024-03-01 DIAGNOSIS — I50.9 CONGESTIVE HEART FAILURE, UNSPECIFIED HF CHRONICITY, UNSPECIFIED HEART FAILURE TYPE (HCC): Primary | ICD-10-CM

## 2024-03-01 DIAGNOSIS — I48.91 ATRIAL FIBRILLATION, UNSPECIFIED TYPE (HCC): ICD-10-CM

## 2024-03-01 PROCEDURE — G8417 CALC BMI ABV UP PARAM F/U: HCPCS | Performed by: INTERNAL MEDICINE

## 2024-03-01 PROCEDURE — 93000 ELECTROCARDIOGRAM COMPLETE: CPT | Performed by: INTERNAL MEDICINE

## 2024-03-01 PROCEDURE — 1036F TOBACCO NON-USER: CPT | Performed by: INTERNAL MEDICINE

## 2024-03-01 PROCEDURE — 99214 OFFICE O/P EST MOD 30 MIN: CPT | Performed by: INTERNAL MEDICINE

## 2024-03-01 PROCEDURE — G8400 PT W/DXA NO RESULTS DOC: HCPCS | Performed by: INTERNAL MEDICINE

## 2024-03-01 PROCEDURE — 3017F COLORECTAL CA SCREEN DOC REV: CPT | Performed by: INTERNAL MEDICINE

## 2024-03-01 PROCEDURE — G8427 DOCREV CUR MEDS BY ELIG CLIN: HCPCS | Performed by: INTERNAL MEDICINE

## 2024-03-01 PROCEDURE — 1090F PRES/ABSN URINE INCON ASSESS: CPT | Performed by: INTERNAL MEDICINE

## 2024-03-01 PROCEDURE — G8484 FLU IMMUNIZE NO ADMIN: HCPCS | Performed by: INTERNAL MEDICINE

## 2024-03-01 PROCEDURE — 1123F ACP DISCUSS/DSCN MKR DOCD: CPT | Performed by: INTERNAL MEDICINE

## 2024-03-01 RX ORDER — DOFETILIDE 0.25 MG/1
500 CAPSULE ORAL 2 TIMES DAILY
COMMUNITY
End: 2024-03-04

## 2024-03-01 RX ORDER — LOSARTAN POTASSIUM 25 MG/1
25 TABLET ORAL DAILY
Qty: 90 TABLET | Refills: 1 | Status: SHIPPED
Start: 2024-03-01 | End: 2024-03-15 | Stop reason: SDUPTHER

## 2024-03-01 RX ORDER — METOPROLOL SUCCINATE 25 MG/1
25 TABLET, EXTENDED RELEASE ORAL 2 TIMES DAILY
Qty: 180 TABLET | Refills: 3 | Status: SHIPPED | OUTPATIENT
Start: 2024-03-01

## 2024-03-01 RX ORDER — SPIRONOLACTONE 25 MG/1
25 TABLET ORAL DAILY
Qty: 90 TABLET | Refills: 3 | Status: SHIPPED
Start: 2024-03-01 | End: 2024-03-15 | Stop reason: SDUPTHER

## 2024-03-01 NOTE — PROGRESS NOTES
multiple ablations last was in 4/10 with Dr. Oliveros in [facility]    Seen locally by Dr. De Leon.     4. Pulmonary issues: On oxygen. Under second opinion evaluation by pulmonary.     5. CKD: Follow labs.     6. Anemia: Follow labs.    Available external charts reviewed.   Available imaging and evaluations independently reviewed.   Interviewed and discussed patient with available family.    Quentin Chan D.O.  Cardiologist  Cardiology, [facility]

## 2024-03-04 RX ORDER — DOFETILIDE 0.25 MG/1
250 CAPSULE ORAL 2 TIMES DAILY
Qty: 180 CAPSULE | Refills: 1 | Status: SHIPPED | OUTPATIENT
Start: 2024-03-04

## 2024-03-15 RX ORDER — SPIRONOLACTONE 25 MG/1
25 TABLET ORAL DAILY
Qty: 90 TABLET | Refills: 3 | Status: SHIPPED | OUTPATIENT
Start: 2024-03-15

## 2024-03-15 RX ORDER — LOSARTAN POTASSIUM 25 MG/1
25 TABLET ORAL DAILY
Qty: 90 TABLET | Refills: 3 | Status: SHIPPED | OUTPATIENT
Start: 2024-03-15

## 2024-03-19 ENCOUNTER — HOSPITAL ENCOUNTER (OUTPATIENT)
Dept: HOSPITAL 83 - LAB | Age: 69
Discharge: HOME | End: 2024-03-19
Payer: MEDICARE

## 2024-03-19 DIAGNOSIS — E78.5: ICD-10-CM

## 2024-03-19 DIAGNOSIS — E55.9: ICD-10-CM

## 2024-03-19 DIAGNOSIS — R53.83: ICD-10-CM

## 2024-03-19 DIAGNOSIS — R79.89: ICD-10-CM

## 2024-03-19 DIAGNOSIS — D63.1: ICD-10-CM

## 2024-03-19 DIAGNOSIS — N18.30: Primary | ICD-10-CM

## 2024-03-19 DIAGNOSIS — Z87.440: ICD-10-CM

## 2024-03-19 LAB
25(OH)D3 SERPL-MCNC: 40.3 NG/ML (ref 30–100)
ALP SERPL-CCNC: 67 U/L (ref 46–116)
ALT SERPL W P-5'-P-CCNC: 19 U/L (ref 5–49)
BASOPHILS # BLD AUTO: 0.1 10*3/UL (ref 0–0.1)
BASOPHILS NFR BLD AUTO: 0.8 % (ref 0–1)
BUN SERPL-MCNC: 21 MG/DL (ref 9–23)
BUN SERPL-MCNC: 23 MG/DL (ref 9–23)
CHLORIDE SERPL-SCNC: 102 MMOL/L (ref 98–107)
CHLORIDE SERPL-SCNC: 102 MMOL/L (ref 98–107)
CHOLEST SERPL-MCNC: 166 MG/DL (ref ?–200)
CREAT UR-MCNC: 202.97 MG/DL
EOSINOPHIL # BLD AUTO: 0.2 10*3/UL (ref 0–0.4)
EOSINOPHIL # BLD AUTO: 3.4 % (ref 1–4)
ERYTHROCYTE [DISTWIDTH] IN BLOOD BY AUTOMATED COUNT: 13.5 % (ref 0–14.5)
GGT SERPL-CCNC: 57 U/L (ref 0–73)
HCT VFR BLD AUTO: 34.6 % (ref 37–47)
LDLC SERPL DIRECT ASSAY-MCNC: 88 MG/DL (ref 9–159)
LEUKOCYTE ESTERASE UR QL STRIP: (no result)
LYMPHOCYTES # BLD AUTO: 0.7 10*3/UL (ref 1.3–4.4)
LYMPHOCYTES NFR BLD AUTO: 10.9 % (ref 27–41)
MCH RBC QN AUTO: 31.9 PG (ref 27–31)
MCHC RBC AUTO-ENTMCNC: 31.5 G/DL (ref 33–37)
MCV RBC AUTO: 101.2 FL (ref 81–99)
MONOCYTES # BLD AUTO: 0.5 10*3/UL (ref 0.1–1)
MONOCYTES NFR BLD MANUAL: 8.3 % (ref 3–9)
NEUT #: 4.9 10*3/UL (ref 2.3–7.9)
NEUT %: 75.4 % (ref 47–73)
NRBC BLD QL AUTO: 0 % (ref 0–0)
PH UR STRIP: 6 [PH] (ref 4.5–8)
PLATELET # BLD AUTO: 146 10*3/UL (ref 130–400)
PMV BLD AUTO: 10.6 FL (ref 9.6–12.3)
POTASSIUM SERPL-SCNC: 4.2 MMOL/L (ref 3.4–5.1)
POTASSIUM SERPL-SCNC: 4.2 MMOL/L (ref 3.4–5.1)
PROT SERPL-MCNC: 7.3 GM/DL (ref 6–8)
RBC # BLD AUTO: 3.42 10*6/UL (ref 4.1–5.1)
RETICS/RBC NFR AUTO: 1.08 % (ref 0.5–2.5)
SP GR UR: 1.02 (ref 1–1.03)
T3 SERPL-MCNC: 30.8 % (ref 22.4–36.7)
T4 SERPL-MCNC: 5.2 UG/DL (ref 4.5–10.9)
TRIGL SERPL-MCNC: 62 MG/DL (ref ?–150)
UROBILINOGEN UR STRIP-MCNC: 1 E.U./DL (ref 0–1)
WBC #/AREA URNS HPF: (no result) WBC/HPF (ref 0–5)
WBC NRBC COR # BLD AUTO: 6.5 10*3/UL (ref 4.8–10.8)

## 2024-03-19 RX ORDER — METOPROLOL SUCCINATE 25 MG/1
25 TABLET, EXTENDED RELEASE ORAL 2 TIMES DAILY
Qty: 180 TABLET | Refills: 3 | Status: SHIPPED | OUTPATIENT
Start: 2024-03-19

## 2024-07-29 RX ORDER — DOFETILIDE 0.25 MG/1
250 CAPSULE ORAL 2 TIMES DAILY
Qty: 180 CAPSULE | Refills: 1 | Status: SHIPPED | OUTPATIENT
Start: 2024-07-29

## 2024-08-01 ENCOUNTER — OFFICE VISIT (OUTPATIENT)
Dept: CARDIOLOGY CLINIC | Age: 69
End: 2024-08-01
Payer: MEDICARE

## 2024-08-01 VITALS
WEIGHT: 211 LBS | HEART RATE: 79 BPM | RESPIRATION RATE: 20 BRPM | HEIGHT: 65 IN | BODY MASS INDEX: 35.16 KG/M2 | DIASTOLIC BLOOD PRESSURE: 62 MMHG | SYSTOLIC BLOOD PRESSURE: 110 MMHG

## 2024-08-01 DIAGNOSIS — I50.9 CONGESTIVE HEART FAILURE, UNSPECIFIED HF CHRONICITY, UNSPECIFIED HEART FAILURE TYPE (HCC): Primary | ICD-10-CM

## 2024-08-01 PROCEDURE — 1090F PRES/ABSN URINE INCON ASSESS: CPT | Performed by: INTERNAL MEDICINE

## 2024-08-01 PROCEDURE — 3017F COLORECTAL CA SCREEN DOC REV: CPT | Performed by: INTERNAL MEDICINE

## 2024-08-01 PROCEDURE — G8417 CALC BMI ABV UP PARAM F/U: HCPCS | Performed by: INTERNAL MEDICINE

## 2024-08-01 PROCEDURE — G8400 PT W/DXA NO RESULTS DOC: HCPCS | Performed by: INTERNAL MEDICINE

## 2024-08-01 PROCEDURE — G8427 DOCREV CUR MEDS BY ELIG CLIN: HCPCS | Performed by: INTERNAL MEDICINE

## 2024-08-01 PROCEDURE — 99214 OFFICE O/P EST MOD 30 MIN: CPT | Performed by: INTERNAL MEDICINE

## 2024-08-01 PROCEDURE — 93000 ELECTROCARDIOGRAM COMPLETE: CPT | Performed by: INTERNAL MEDICINE

## 2024-08-01 PROCEDURE — 1036F TOBACCO NON-USER: CPT | Performed by: INTERNAL MEDICINE

## 2024-08-01 PROCEDURE — 1123F ACP DISCUSS/DSCN MKR DOCD: CPT | Performed by: INTERNAL MEDICINE

## 2024-08-01 NOTE — PROGRESS NOTES
CHIEF COMPLAINT: MVR/PAfib/SOB    HISTORY OF PRESENT ILLNESS: Patient is a 69 y.o. female seen at the request of Osvaldo Mobley DO.     Patient with history of MVR.    Stable improved OLIVER. Minimal edema.      No CP or angina.     Past Medical History:   Diagnosis Date    Atrial fibrillation (HCC)     Atrial flutter (HCC)     CAD (coronary artery disease)     CHF (congestive heart failure) ([facility])     10/17/2011-echo revealed an estimated LVEF of 55-60%    COPD (chronic obstructive pulmonary disease) ([facility])     pt states she doesnt have COPD    OLIVER (dyspnea on exertion)     Hypertension     Palpitations     Pulmonary hypertension (HCC)     SOB (shortness of breath)     Valvular disease        Patient Active Problem List   Diagnosis    Valvular disease    Pulmonary hypertension (HCC)    CHF (congestive heart failure) ([facility])    Atrial fibrillation or flutter    Chronic kidney disease, stage 3a (HCC)    Anemia in chronic kidney disease    Secondary hyperparathyroidism of renal origin ([facility])       No Known Allergies      Current Outpatient Medications   Medication Sig Dispense Refill    dofetilide (TIKOSYN) 250 MCG capsule TAKE 1 CAPSULE BY MOUTH TWICE  DAILY 180 capsule 1    metoprolol succinate (TOPROL XL) 25 MG extended release tablet Take 1 tablet by mouth in the morning and at bedtime 180 tablet 3    losartan (COZAAR) 25 MG tablet Take 1 tablet by mouth daily 90 tablet 3    spironolactone (ALDACTONE) 25 MG tablet Take 1 tablet by mouth daily 90 tablet 3    furosemide (LASIX) 20 MG tablet Take 1 tablet by mouth daily 90 tablet 3    LORazepam (ATIVAN) 0.5 MG tablet Take 1 tablet by mouth daily as needed for Anxiety.      aspirin 81 MG EC tablet Take 1 tablet by mouth daily      sertraline (ZOLOFT) 100 MG tablet Take 2 tablets by mouth daily Instructed to take am of procedure      montelukast (SINGULAIR) 10 MG tablet Take 1 tablet by mouth nightly  3    OXYGEN Inhale into the lungs 3 l nasal cannula

## 2024-08-13 ENCOUNTER — HOSPITAL ENCOUNTER (OUTPATIENT)
Dept: HOSPITAL 83 - LAB | Age: 69
Discharge: HOME | End: 2024-08-13
Attending: INTERNAL MEDICINE
Payer: MEDICARE

## 2024-08-13 DIAGNOSIS — M19.012: Primary | ICD-10-CM

## 2024-08-13 DIAGNOSIS — N18.30: ICD-10-CM

## 2024-08-13 DIAGNOSIS — D63.1: ICD-10-CM

## 2024-08-13 DIAGNOSIS — N25.81: ICD-10-CM

## 2024-08-13 DIAGNOSIS — E55.9: ICD-10-CM

## 2024-08-13 LAB
25(OH)D3 SERPL-MCNC: 49.5 NG/ML (ref 30–100)
BACTERIA #/AREA URNS HPF: (no result) /[HPF]
BASOPHILS # BLD AUTO: 0.1 10*3/UL (ref 0–0.1)
BASOPHILS NFR BLD AUTO: 0.6 % (ref 0–1)
BUN SERPL-MCNC: 22 MG/DL (ref 9–23)
CHLORIDE SERPL-SCNC: 103 MMOL/L (ref 98–107)
CREAT UR-MCNC: 220.16 MG/DL
EOSINOPHIL # BLD AUTO: 0.2 10*3/UL (ref 0–0.4)
EOSINOPHIL # BLD AUTO: 2.1 % (ref 1–4)
ERYTHROCYTE [DISTWIDTH] IN BLOOD BY AUTOMATED COUNT: 13.6 % (ref 0–14.5)
HCT VFR BLD AUTO: 35.9 % (ref 37–47)
LEUKOCYTE ESTERASE UR QL STRIP: (no result)
LYMPHOCYTES # BLD AUTO: 0.8 10*3/UL (ref 1.3–4.4)
LYMPHOCYTES NFR BLD AUTO: 9.7 % (ref 27–41)
MCH RBC QN AUTO: 32.6 PG (ref 27–31)
MCHC RBC AUTO-ENTMCNC: 32.3 G/DL (ref 33–37)
MCV RBC AUTO: 100.8 FL (ref 81–99)
MONOCYTES # BLD AUTO: 0.7 10*3/UL (ref 0.1–1)
MONOCYTES NFR BLD MANUAL: 8.3 % (ref 3–9)
NEUT #: 6.7 10*3/UL (ref 2.3–7.9)
NEUT %: 79.1 % (ref 47–73)
NRBC BLD QL AUTO: 0 % (ref 0–0)
PH UR STRIP: 6 [PH] (ref 4.5–8)
PLATELET # BLD AUTO: 148 10*3/UL (ref 130–400)
PMV BLD AUTO: 10.5 FL (ref 9.6–12.3)
POTASSIUM SERPL-SCNC: 4.6 MMOL/L (ref 3.4–5.1)
RBC # BLD AUTO: 3.56 10*6/UL (ref 4.1–5.1)
SP GR UR: 1.02 (ref 1–1.03)
UROBILINOGEN UR STRIP-MCNC: 1 E.U./DL (ref 0–1)
WBC #/AREA URNS HPF: (no result) WBC/HPF (ref 0–5)
WBC NRBC COR # BLD AUTO: 8.5 10*3/UL (ref 4.8–10.8)

## 2024-09-30 ENCOUNTER — HOSPITAL ENCOUNTER (OUTPATIENT)
Dept: HOSPITAL 83 - RAD | Age: 69
Discharge: HOME | End: 2024-09-30
Attending: FAMILY MEDICINE
Payer: MEDICARE

## 2024-09-30 DIAGNOSIS — M81.0: ICD-10-CM

## 2024-09-30 DIAGNOSIS — E55.9: ICD-10-CM

## 2024-09-30 DIAGNOSIS — N95.1: ICD-10-CM

## 2024-09-30 DIAGNOSIS — Z13.820: Primary | ICD-10-CM

## 2024-12-16 ENCOUNTER — TELEPHONE (OUTPATIENT)
Dept: NON INVASIVE DIAGNOSTICS | Age: 69
End: 2024-12-16

## 2024-12-16 RX ORDER — DOFETILIDE 0.25 MG/1
250 CAPSULE ORAL 2 TIMES DAILY
Qty: 180 CAPSULE | Refills: 1 | Status: SHIPPED | OUTPATIENT
Start: 2024-12-16

## 2025-01-13 RX ORDER — LOSARTAN POTASSIUM 25 MG/1
25 TABLET ORAL DAILY
Qty: 90 TABLET | Refills: 3 | Status: SHIPPED | OUTPATIENT
Start: 2025-01-13

## 2025-01-13 RX ORDER — SPIRONOLACTONE 25 MG/1
25 TABLET ORAL DAILY
Qty: 90 TABLET | Refills: 3 | Status: SHIPPED | OUTPATIENT
Start: 2025-01-13

## 2025-02-21 ENCOUNTER — HOSPITAL ENCOUNTER (OUTPATIENT)
Dept: HOSPITAL 83 - LAB | Age: 70
Discharge: HOME | End: 2025-02-21
Attending: INTERNAL MEDICINE
Payer: MEDICARE

## 2025-02-21 DIAGNOSIS — E78.5: ICD-10-CM

## 2025-02-21 DIAGNOSIS — N18.30: Primary | ICD-10-CM

## 2025-02-21 DIAGNOSIS — D63.1: ICD-10-CM

## 2025-02-21 DIAGNOSIS — E55.9: ICD-10-CM

## 2025-02-21 DIAGNOSIS — R79.89: ICD-10-CM

## 2025-02-21 DIAGNOSIS — R53.83: ICD-10-CM

## 2025-02-21 LAB
25(OH)D3 SERPL-MCNC: 44.3 NG/ML (ref 30–100)
ALP SERPL-CCNC: 62 U/L (ref 46–116)
ALT SERPL W P-5'-P-CCNC: 16 U/L (ref 5–49)
BACTERIA #/AREA URNS HPF: (no result) /[HPF]
BASOPHILS # BLD AUTO: 0.1 10*3/UL (ref 0–0.1)
BASOPHILS NFR BLD AUTO: 0.5 % (ref 0–1)
BUN SERPL-MCNC: 21 MG/DL (ref 9–23)
BUN SERPL-MCNC: 21 MG/DL (ref 9–23)
CHLORIDE SERPL-SCNC: 100 MMOL/L (ref 98–107)
CHLORIDE SERPL-SCNC: 100 MMOL/L (ref 98–107)
CHOLEST SERPL-MCNC: 146 MG/DL (ref ?–200)
EOSINOPHIL # BLD AUTO: 0.2 10*3/UL (ref 0–0.4)
EOSINOPHIL # BLD AUTO: 1.6 % (ref 1–4)
EPI CELLS #/AREA URNS HPF: (no result) /[HPF]
ERYTHROCYTE [DISTWIDTH] IN BLOOD BY AUTOMATED COUNT: 13.6 % (ref 0–14.5)
GGT SERPL-CCNC: 42 U/L (ref 0–73)
HCT VFR BLD AUTO: 35.7 % (ref 37–47)
HGB UR QL STRIP: (no result)
LDLC SERPL DIRECT ASSAY-MCNC: 80 MG/DL (ref 9–159)
LEUKOCYTE ESTERASE UR QL STRIP: (no result)
MCH RBC QN AUTO: 31.7 PG (ref 27–31)
MCHC RBC AUTO-ENTMCNC: 30.3 G/DL (ref 33–37)
MCV RBC AUTO: 104.7 FL (ref 81–99)
MONOCYTES # BLD AUTO: 0.7 10*3/UL (ref 0.1–1)
MONOCYTES NFR BLD MANUAL: 7.5 % (ref 3–9)
NEUT #: 8.3 10*3/UL (ref 2.3–7.9)
NEUT %: 83.3 % (ref 47–73)
NRBC BLD QL AUTO: 0 % (ref 0–0)
PH UR STRIP: 6.5 [PH] (ref 4.5–8)
PLATELET # BLD AUTO: 184 10*3/UL (ref 130–400)
PMV BLD AUTO: 10.3 FL (ref 9.6–12.3)
POTASSIUM SERPL-SCNC: 4.6 MMOL/L (ref 3.4–5.1)
POTASSIUM SERPL-SCNC: 4.6 MMOL/L (ref 3.4–5.1)
PROT SERPL-MCNC: 7.4 GM/DL (ref 6–8)
RBC # BLD AUTO: 3.41 10*6/UL (ref 4.1–5.1)
RBC #/AREA URNS HPF: (no result) RBC/HPF (ref 0–2)
RETICS/RBC NFR AUTO: 1.16 % (ref 0.5–2.5)
SP GR UR: 1.02 (ref 1–1.03)
T3 SERPL-MCNC: 40.6 % (ref 22.4–36.7)
T4 SERPL-MCNC: 4 UG/DL (ref 4.5–10.9)
TRIGL SERPL-MCNC: 80 MG/DL (ref ?–150)
UROBILINOGEN UR STRIP-MCNC: 2 E.U./DL (ref 0–1)
WBC #/AREA URNS HPF: (no result) WBC/HPF (ref 0–5)
WBC NRBC COR # BLD AUTO: 9.9 10*3/UL (ref 4.8–10.8)

## 2025-03-03 RX ORDER — METOPROLOL SUCCINATE 25 MG/1
25 TABLET, EXTENDED RELEASE ORAL 2 TIMES DAILY
Qty: 180 TABLET | Refills: 3 | Status: SHIPPED | OUTPATIENT
Start: 2025-03-03

## 2025-05-27 RX ORDER — DOFETILIDE 0.25 MG/1
250 CAPSULE ORAL 2 TIMES DAILY
Qty: 180 CAPSULE | Refills: 3 | Status: SHIPPED | OUTPATIENT
Start: 2025-05-27